# Patient Record
Sex: MALE | Race: WHITE | NOT HISPANIC OR LATINO | Employment: OTHER | URBAN - NONMETROPOLITAN AREA
[De-identification: names, ages, dates, MRNs, and addresses within clinical notes are randomized per-mention and may not be internally consistent; named-entity substitution may affect disease eponyms.]

---

## 2017-01-20 RX ORDER — RANITIDINE 150 MG/1
TABLET ORAL
Qty: 60 TABLET | Refills: 0 | Status: SHIPPED | OUTPATIENT
Start: 2017-01-20 | End: 2017-02-17 | Stop reason: SDUPTHER

## 2017-01-27 RX ORDER — GABAPENTIN 600 MG/1
TABLET ORAL
Qty: 90 TABLET | Refills: 0 | OUTPATIENT
Start: 2017-01-27

## 2017-01-27 RX ORDER — TAMSULOSIN HYDROCHLORIDE 0.4 MG/1
CAPSULE ORAL
Qty: 15 CAPSULE | Refills: 0 | OUTPATIENT
Start: 2017-01-27

## 2017-01-27 RX ORDER — ATORVASTATIN CALCIUM 40 MG/1
TABLET, FILM COATED ORAL
Qty: 30 TABLET | Refills: 0 | OUTPATIENT
Start: 2017-01-27

## 2017-01-27 RX ORDER — ERGOCALCIFEROL 1.25 MG/1
CAPSULE ORAL
Qty: 2 CAPSULE | Refills: 0 | OUTPATIENT
Start: 2017-01-27

## 2017-01-27 RX ORDER — IBUPROFEN 800 MG/1
TABLET ORAL
Qty: 30 TABLET | Refills: 0 | OUTPATIENT
Start: 2017-01-27

## 2017-02-14 RX ORDER — LEVETIRACETAM 1000 MG/1
TABLET ORAL
Qty: 120 TABLET | Refills: 0 | Status: SHIPPED | OUTPATIENT
Start: 2017-02-14 | End: 2017-04-13 | Stop reason: SDUPTHER

## 2017-02-17 RX ORDER — RANITIDINE 150 MG/1
TABLET ORAL
Qty: 60 TABLET | Refills: 0 | Status: SHIPPED | OUTPATIENT
Start: 2017-02-17 | End: 2017-03-16 | Stop reason: SDUPTHER

## 2017-03-17 RX ORDER — RANITIDINE 150 MG/1
TABLET ORAL
Qty: 60 TABLET | Refills: 11 | Status: SHIPPED | OUTPATIENT
Start: 2017-03-17 | End: 2017-07-12 | Stop reason: SDUPTHER

## 2017-04-14 RX ORDER — LEVETIRACETAM 1000 MG/1
TABLET ORAL
Qty: 120 TABLET | Refills: 0 | Status: SHIPPED | OUTPATIENT
Start: 2017-04-14 | End: 2017-06-28 | Stop reason: SDUPTHER

## 2017-06-22 RX ORDER — LEVETIRACETAM 1000 MG/1
TABLET ORAL
Qty: 120 TABLET | Refills: 0 | OUTPATIENT
Start: 2017-06-22

## 2017-06-28 DIAGNOSIS — G40.909 SEIZURE DISORDER (HCC): Primary | ICD-10-CM

## 2017-06-28 RX ORDER — LEVETIRACETAM 1000 MG/1
1000 TABLET ORAL 2 TIMES DAILY
Qty: 60 TABLET | Refills: 0 | Status: SHIPPED | OUTPATIENT
Start: 2017-06-28 | End: 2017-06-30 | Stop reason: SDUPTHER

## 2017-06-29 RX ORDER — LEVETIRACETAM 1000 MG/1
TABLET ORAL
Qty: 120 TABLET | Refills: 0 | OUTPATIENT
Start: 2017-06-29

## 2017-06-30 ENCOUNTER — TELEPHONE (OUTPATIENT)
Dept: FAMILY MEDICINE CLINIC | Facility: CLINIC | Age: 57
End: 2017-06-30

## 2017-06-30 DIAGNOSIS — G40.909 SEIZURE DISORDER (HCC): ICD-10-CM

## 2017-06-30 RX ORDER — LEVETIRACETAM 1000 MG/1
1000 TABLET ORAL 2 TIMES DAILY
Qty: 60 TABLET | Refills: 0 | Status: SHIPPED | OUTPATIENT
Start: 2017-06-30 | End: 2017-07-12 | Stop reason: SDUPTHER

## 2017-06-30 NOTE — TELEPHONE ENCOUNTER
PATIENT NEEDS REFILL ON : KEPPRA   UNTIL APPOINTMENT ON July 12  [facility] IN [place]    THANK YOU  ALEXA

## 2017-07-11 ENCOUNTER — LAB (OUTPATIENT)
Dept: LAB | Facility: HOSPITAL | Age: 57
End: 2017-07-11

## 2017-07-11 ENCOUNTER — OFFICE VISIT (OUTPATIENT)
Dept: FAMILY MEDICINE CLINIC | Facility: CLINIC | Age: 57
End: 2017-07-11

## 2017-07-11 VITALS
OXYGEN SATURATION: 97 % | SYSTOLIC BLOOD PRESSURE: 130 MMHG | HEART RATE: 100 BPM | DIASTOLIC BLOOD PRESSURE: 88 MMHG | WEIGHT: 233 LBS | HEIGHT: 74 IN | BODY MASS INDEX: 29.9 KG/M2

## 2017-07-11 DIAGNOSIS — K21.9 GASTROESOPHAGEAL REFLUX DISEASE, ESOPHAGITIS PRESENCE NOT SPECIFIED: ICD-10-CM

## 2017-07-11 DIAGNOSIS — G40.909 SEIZURE DISORDER (HCC): Primary | ICD-10-CM

## 2017-07-11 DIAGNOSIS — G89.29 CHRONIC BILATERAL LOW BACK PAIN WITHOUT SCIATICA: ICD-10-CM

## 2017-07-11 DIAGNOSIS — M54.50 CHRONIC BILATERAL LOW BACK PAIN WITHOUT SCIATICA: ICD-10-CM

## 2017-07-11 DIAGNOSIS — F17.200 TOBACCO DEPENDENCE: ICD-10-CM

## 2017-07-11 DIAGNOSIS — R76.8 POSITIVE HEPATITIS C ANTIBODY TEST: ICD-10-CM

## 2017-07-11 DIAGNOSIS — I63.9 CEREBROVASCULAR ACCIDENT (CVA), UNSPECIFIED MECHANISM (HCC): ICD-10-CM

## 2017-07-11 DIAGNOSIS — Z13.9 SCREENING: ICD-10-CM

## 2017-07-11 DIAGNOSIS — E06.3 HASHIMOTO'S THYROIDITIS: ICD-10-CM

## 2017-07-11 DIAGNOSIS — Z51.81 MEDICATION MONITORING ENCOUNTER: ICD-10-CM

## 2017-07-11 DIAGNOSIS — E78.5 DYSLIPIDEMIA: ICD-10-CM

## 2017-07-11 LAB
25(OH)D3 SERPL-MCNC: 28.1 NG/ML (ref 30–100)
ALBUMIN SERPL-MCNC: 4.3 G/DL (ref 3.4–4.8)
ALBUMIN/GLOB SERPL: 1.2 G/DL (ref 1.1–1.8)
ALP SERPL-CCNC: 89 U/L (ref 38–126)
ALT SERPL W P-5'-P-CCNC: 35 U/L (ref 21–72)
ANION GAP SERPL CALCULATED.3IONS-SCNC: 14 MMOL/L (ref 5–15)
ARTICHOKE IGE QN: 144 MG/DL (ref 1–129)
AST SERPL-CCNC: 27 U/L (ref 17–59)
BASOPHILS # BLD AUTO: 0.03 10*3/MM3 (ref 0–0.2)
BASOPHILS NFR BLD AUTO: 0.4 % (ref 0–2)
BILIRUB SERPL-MCNC: 0.3 MG/DL (ref 0.2–1.3)
BUN BLD-MCNC: 16 MG/DL (ref 7–21)
BUN/CREAT SERPL: 18.6 (ref 7–25)
CALCIUM SPEC-SCNC: 8.9 MG/DL (ref 8.4–10.2)
CHLORIDE SERPL-SCNC: 102 MMOL/L (ref 95–110)
CHOLEST SERPL-MCNC: 230 MG/DL (ref 0–199)
CO2 SERPL-SCNC: 27 MMOL/L (ref 22–31)
CREAT BLD-MCNC: 0.86 MG/DL (ref 0.7–1.3)
DEPRECATED RDW RBC AUTO: 45.7 FL (ref 35.1–43.9)
EOSINOPHIL # BLD AUTO: 0.26 10*3/MM3 (ref 0–0.7)
EOSINOPHIL NFR BLD AUTO: 3.1 % (ref 0–7)
ERYTHROCYTE [DISTWIDTH] IN BLOOD BY AUTOMATED COUNT: 13.2 % (ref 11.5–14.5)
GFR SERPL CREATININE-BSD FRML MDRD: 92 ML/MIN/1.73 (ref 56–130)
GLOBULIN UR ELPH-MCNC: 3.7 GM/DL (ref 2.3–3.5)
GLUCOSE BLD-MCNC: 86 MG/DL (ref 60–100)
HCT VFR BLD AUTO: 47.4 % (ref 39–49)
HDLC SERPL-MCNC: 29 MG/DL (ref 60–200)
HGB BLD-MCNC: 16.4 G/DL (ref 13.7–17.3)
IMM GRANULOCYTES # BLD: 0.02 10*3/MM3 (ref 0–0.02)
IMM GRANULOCYTES NFR BLD: 0.2 % (ref 0–0.5)
LDLC/HDLC SERPL: ABNORMAL {RATIO} (ref 0–3.55)
LYMPHOCYTES # BLD AUTO: 2.99 10*3/MM3 (ref 0.6–4.2)
LYMPHOCYTES NFR BLD AUTO: 36 % (ref 10–50)
MCH RBC QN AUTO: 33 PG (ref 26.5–34)
MCHC RBC AUTO-ENTMCNC: 34.6 G/DL (ref 31.5–36.3)
MCV RBC AUTO: 95.4 FL (ref 80–98)
MONOCYTES # BLD AUTO: 1.07 10*3/MM3 (ref 0–0.9)
MONOCYTES NFR BLD AUTO: 12.9 % (ref 0–12)
NEUTROPHILS # BLD AUTO: 3.94 10*3/MM3 (ref 2–8.6)
NEUTROPHILS NFR BLD AUTO: 47.4 % (ref 37–80)
PLATELET # BLD AUTO: 287 10*3/MM3 (ref 150–450)
PMV BLD AUTO: 10.5 FL (ref 8–12)
POTASSIUM BLD-SCNC: 3.9 MMOL/L (ref 3.5–5.1)
PROT SERPL-MCNC: 8 G/DL (ref 6.3–8.6)
RBC # BLD AUTO: 4.97 10*6/MM3 (ref 4.37–5.74)
SODIUM BLD-SCNC: 143 MMOL/L (ref 137–145)
TRIGL SERPL-MCNC: 509 MG/DL (ref 20–199)
TSH SERPL DL<=0.05 MIU/L-ACNC: 2.4 MIU/ML (ref 0.46–4.68)
WBC NRBC COR # BLD: 8.31 10*3/MM3 (ref 3.2–9.8)

## 2017-07-11 PROCEDURE — 80050 GENERAL HEALTH PANEL: CPT | Performed by: FAMILY MEDICINE

## 2017-07-11 PROCEDURE — 99213 OFFICE O/P EST LOW 20 MIN: CPT | Performed by: FAMILY MEDICINE

## 2017-07-11 PROCEDURE — 80177 DRUG SCRN QUAN LEVETIRACETAM: CPT | Performed by: FAMILY MEDICINE

## 2017-07-11 PROCEDURE — 80061 LIPID PANEL: CPT | Performed by: FAMILY MEDICINE

## 2017-07-11 PROCEDURE — 86803 HEPATITIS C AB TEST: CPT | Performed by: FAMILY MEDICINE

## 2017-07-11 PROCEDURE — 36415 COLL VENOUS BLD VENIPUNCTURE: CPT | Performed by: FAMILY MEDICINE

## 2017-07-11 PROCEDURE — G0481 DRUG TEST DEF 8-14 CLASSES: HCPCS | Performed by: FAMILY MEDICINE

## 2017-07-11 PROCEDURE — 80307 DRUG TEST PRSMV CHEM ANLYZR: CPT | Performed by: FAMILY MEDICINE

## 2017-07-11 PROCEDURE — 84439 ASSAY OF FREE THYROXINE: CPT | Performed by: FAMILY MEDICINE

## 2017-07-11 PROCEDURE — 82306 VITAMIN D 25 HYDROXY: CPT | Performed by: FAMILY MEDICINE

## 2017-07-11 PROCEDURE — 87522 HEPATITIS C REVRS TRNSCRPJ: CPT | Performed by: FAMILY MEDICINE

## 2017-07-11 RX ORDER — GABAPENTIN 600 MG/1
600 TABLET ORAL 3 TIMES DAILY
Qty: 90 TABLET | Refills: 0 | Status: SHIPPED | OUTPATIENT
Start: 2017-07-11 | End: 2018-02-21 | Stop reason: SINTOL

## 2017-07-12 PROBLEM — F17.200 TOBACCO DEPENDENCE: Status: ACTIVE | Noted: 2017-07-12

## 2017-07-12 LAB — HCV AB SER DONR QL: REACTIVE

## 2017-07-12 RX ORDER — LEVOTHYROXINE SODIUM 0.03 MG/1
25 TABLET ORAL DAILY
Qty: 30 TABLET | Refills: 3 | Status: SHIPPED | OUTPATIENT
Start: 2017-07-12 | End: 2018-01-08 | Stop reason: SDUPTHER

## 2017-07-12 RX ORDER — LEVETIRACETAM 1000 MG/1
1000 TABLET ORAL 2 TIMES DAILY
Qty: 60 TABLET | Refills: 2 | Status: SHIPPED | OUTPATIENT
Start: 2017-07-12 | End: 2017-08-08 | Stop reason: SDUPTHER

## 2017-07-12 RX ORDER — ASPIRIN 81 MG/1
81 TABLET, CHEWABLE ORAL DAILY
Qty: 30 TABLET | Refills: 11 | Status: SHIPPED | OUTPATIENT
Start: 2017-07-12 | End: 2018-02-21 | Stop reason: SDUPTHER

## 2017-07-12 RX ORDER — LEVOTHYROXINE SODIUM 0.03 MG/1
25 TABLET ORAL DAILY
COMMUNITY
Start: 2017-07-07 | End: 2017-07-12 | Stop reason: SDUPTHER

## 2017-07-12 RX ORDER — RANITIDINE 150 MG/1
150 TABLET ORAL 2 TIMES DAILY
Qty: 60 TABLET | Refills: 11 | Status: SHIPPED | OUTPATIENT
Start: 2017-07-12 | End: 2018-02-21 | Stop reason: SDUPTHER

## 2017-07-12 RX ORDER — ATORVASTATIN CALCIUM 40 MG/1
40 TABLET, FILM COATED ORAL DAILY
Qty: 30 TABLET | Refills: 3 | Status: SHIPPED | OUTPATIENT
Start: 2017-07-12 | End: 2018-03-22 | Stop reason: SDUPTHER

## 2017-07-12 RX ORDER — BACLOFEN 10 MG/1
10 TABLET ORAL 3 TIMES DAILY
COMMUNITY
Start: 2016-02-10 | End: 2017-07-12 | Stop reason: SDUPTHER

## 2017-07-12 RX ORDER — ASPIRIN 81 MG/1
81 TABLET, CHEWABLE ORAL DAILY
COMMUNITY
Start: 2017-04-13 | End: 2017-07-12 | Stop reason: SDUPTHER

## 2017-07-12 RX ORDER — BACLOFEN 10 MG/1
10 TABLET ORAL 3 TIMES DAILY
Qty: 90 TABLET | Refills: 3 | Status: SHIPPED | OUTPATIENT
Start: 2017-07-12 | End: 2018-02-21 | Stop reason: SDUPTHER

## 2017-07-12 NOTE — PROGRESS NOTES
"  Subjective:     Chief Complaint:   Chief Complaint   Patient presents with   • Med Refill   • Seizures     not in a long time    • Back Pain       Martin Bender is a 56 y.o. male who presents to establish care.  He is a former patient of Dr. Lynn, though curiously chart review shows that he hasn't seen by him in over a year and half.  He has a history of seizure disorder which is well controlled on Keppra.  He states his last seizure was over 10 years ago.  Patient states he has had 2 strokes, most recent was 1 year ago (June 2016) and he was admitted to [facility].  He last saw a neurologist in [place] one year ago.  According to the patient he was \"cleared\" by them.  He also has extensive history of kidney stones, patient states she's had 25 kidney stone removals.  He is following with urology in [place], Dr. Kraus.  Additionally he has been followed by Dr. Nomi Stevenson for Hashimoto's thyroiditis.  He was last seen by him one year ago.    The patient really has no concerns today.  He just wants to establish care with me, and get all of his medications.  He has been only taking the Keppra.        Past Medical Hx:  Past Medical History:   Diagnosis Date   • Age related cataract     right   • Artificial lens present     in position   • Dyslipidemia    • Encounter for general adult medical examination without abnormal findings    • Gastroesophageal reflux disease    • Hashimoto's thyroiditis    • Headache    • Kidney stone    • Primary hyperparathyroidism     possible primary normocalcemic but vit D has to be corrected first   • Secondary hyperparathyroidism    • Seizure disorder        Past Surgical Hx:  Past Surgical History:   Procedure Laterality Date   • COLONOSCOPY  2015   • CYSTOSCOPY  01/11/2016    Left ureteroscopy with laser lithotripsy.Left retrograde pyelography.Left double J stent insertion, double J stent with no tether. [facility]. Per patient had stone removal " "\"25 times\"       Health Maintenance:  Health Maintenance   Topic Date Due   • INFLUENZA VACCINE  08/01/2017   • COLONOSCOPY  01/01/2025   • TDAP/TD VACCINES (2 - Td) 01/28/2026   • PNEUMOCOCCAL VACCINE (19-64 MEDIUM RISK)  Completed   • HEPATITIS C SCREENING  Completed       Current Meds:    Current Outpatient Prescriptions:   •  atorvastatin (LIPITOR) 40 MG tablet, Take 1 tablet by mouth Daily., Disp: 30 tablet, Rfl: 3  •  baclofen (LIORESAL) 10 MG tablet, Take 1 tablet by mouth 3 (Three) Times a Day., Disp: 90 tablet, Rfl: 3  •  gabapentin (NEURONTIN) 600 MG tablet, Take 1 tablet by mouth 3 (Three) Times a Day., Disp: 90 tablet, Rfl: 0  •  levETIRAcetam (KEPPRA) 1000 MG tablet, Take 1 tablet by mouth 2 (Two) Times a Day., Disp: 60 tablet, Rfl: 2  •  raNITIdine (ZANTAC) 150 MG tablet, Take 1 tablet by mouth 2 (Two) Times a Day., Disp: 60 tablet, Rfl: 11  •  aspirin 81 MG chewable tablet, Chew 1 tablet Daily., Disp: 30 tablet, Rfl: 11  •  levothyroxine (SYNTHROID, LEVOTHROID) 25 MCG tablet, Take 1 tablet by mouth Daily., Disp: 30 tablet, Rfl: 3    Allergies:  Penicillins    Family Hx:  Family History   Problem Relation Age of Onset   • Cancer Other    • Diabetes Other    • Heart disease Other    • Hypertension Other         Social History:  Social History     Social History   • Marital status: Single     Spouse name: N/A   • Number of children: N/A   • Years of education: N/A     Occupational History   • Not on file.     Social History Main Topics   • Smoking status: Current Every Day Smoker     Packs/day: 0.50     Years: 30.00     Types: Cigarettes   • Smokeless tobacco: Not on file   • Alcohol use No   • Drug use: No   • Sexual activity: Not on file     Other Topics Concern   • Not on file     Social History Narrative       Review of Systems  Review of Systems   Constitutional: Negative for activity change and appetite change.   HENT: Negative for congestion and dental problem.    Eyes: Negative for discharge and " "itching.   Respiratory: Negative for apnea.    Cardiovascular: Negative for chest pain and leg swelling.   Gastrointestinal: Negative for abdominal distention and abdominal pain.   Endocrine: Negative for cold intolerance.   Genitourinary: Negative for difficulty urinating and enuresis.   Musculoskeletal: Negative for arthralgias and back pain.   Allergic/Immunologic: Negative for environmental allergies and food allergies.   Neurological: Negative for dizziness, facial asymmetry and speech difficulty.   Hematological: Negative for adenopathy.   Psychiatric/Behavioral: Negative for agitation and behavioral problems.         Objective:     /88  Pulse 100  Ht 74\" (188 cm)  Wt 233 lb (106 kg)  SpO2 97%  BMI 29.92 kg/m2    General:  alert, appears stated age and cooperative   Oropharynx: lips, mucosa, and tongue normal; teeth and gums normal    Eyes:  conjunctivae/corneas clear. PERRL, EOM's intact. Fundi benign.    Ears:  normal TM's and external ear canals both ears   Neck: no adenopathy, no carotid bruit, no JVD, supple, symmetrical, trachea midline and thyroid not enlarged, symmetric, no tenderness/mass/nodules   Thyroid:  no palpable nodule   Lung: clear to auscultation bilaterally   Heart:  regular rate and rhythm, S1, S2 normal, no murmur, click, rub or gallop   Abdomen: soft, non-tender; bowel sounds normal; no masses,  no organomegaly   Extremities: extremities normal, atraumatic, no cyanosis or edema   Skin: warm and dry, no hyperpigmentation, vitiligo, or suspicious lesions   Pulses: 2+ and symmetric   Neuro: normal without focal findings, mental status, speech normal, alert and oriented x3 and reflexes normal and symmetric        Assessment/Plan:     1. Medication monitoring encounter  - ToxASSURE Select 13 (); Future  - Levetiracetam Level (Keppra); Future    2. Hashimoto's thyroiditis  - TSH  - levothyroxine (SYNTHROID, LEVOTHROID) 25 MCG tablet; Take 1 tablet by mouth Daily.  Dispense: 30 " tablet; Refill: 3  - Recommended patient see Dr. Nomi Stevenson for follow-up (last seen June 2016).    3. Screening  - Vitamin D 25 hydroxy; Future  - Hepatitis C antibody; Future  - CBC w AUTO Differential; Future  - Comprehensive Metabolic Panel  - Lipid Panel    4. Chronic bilateral low back pain without sciatica  - Patient would like to stay on Neurontin.  Explained to him that with the recent state law changes, he would need to provide a urine specimen for ToxAssure today, and sign drug contract.  Patient is agreeable to do both.  - gabapentin (NEURONTIN) 600 MG tablet; Take 1 tablet by mouth 3 (Three) Times a Day.  Dispense: 90 tablet; Refill: 0  - baclofen (LIORESAL) 10 MG tablet; Take 1 tablet by mouth 3 (Three) Times a Day.  Dispense: 90 tablet; Refill: 3    5. Cerebrovascular accident (CVA), unspecified mechanism  - aspirin 81 MG chewable tablet; Chew 1 tablet Daily.  Dispense: 30 tablet; Refill: 11  - Recommended patient see his neurologist in [place].    6. Gastroesophageal reflux disease, esophagitis presence not specified  - raNITIdine (ZANTAC) 150 MG tablet; Take 1 tablet by mouth 2 (Two) Times a Day.  Dispense: 60 tablet; Refill: 11    7. Seizure disorder  - levETIRAcetam (KEPPRA) 1000 MG tablet; Take 1 tablet by mouth 2 (Two) Times a Day.  Dispense: 60 tablet; Refill: 2    8. Dyslipidemia  - atorvastatin (LIPITOR) 40 MG tablet; Take 1 tablet by mouth Daily.  Dispense: 30 tablet; Refill: 3    Return in about 3 months (around 10/11/2017) for Recheck.    GOALS:  Lose weight, remain seizure-free  BARRIERS TO GOALS:  Poor follow-up    Preventative:  Male Preventative: Last colonoscopy was done 2015.  No record of Hep C antibody test in system.  Patient states he does have history of Hepatitis C.  He will check antibody.  Smoking cessation counseling was provided. Tobacco dependence:  Counseled, 3-10 minutes spent, asymptomatic. Still pre-contemplative.  does not drink  eat more fruits and  vegetables, decrease soda or juice intake, increase water intake, increase physical activity and have 3 meals a day    RISK SCORE: 4          This document has been electronically signed by Branden Lin MD on July 12, 2017 2:01 PM      EMR Dragon/Transcription disclaimer:   Some of this note may be an electronic transcription/translation of spoken language to printed text. The electronic translation of spoken language may permit erroneous, or at times, nonsensical words or phrases to be inadvertently transcribed; Although I have reviewed the note for such errors, some may still exist.

## 2017-07-13 NOTE — PROGRESS NOTES
I have reviewed the notes, assessments, and/or procedures performed. I concur with her/his documentation of Martin Bender.     Timbo Chamorro, DO

## 2017-07-14 LAB — LEVETIRACETAM SERPL-MCNC: 22.4 UG/ML (ref 10–40)

## 2017-07-15 DIAGNOSIS — E06.3 HASHIMOTO'S THYROIDITIS: Primary | ICD-10-CM

## 2017-07-15 DIAGNOSIS — R76.8 POSITIVE HEPATITIS C ANTIBODY TEST: ICD-10-CM

## 2017-07-15 LAB — T4 FREE SERPL-MCNC: 1.09 NG/DL (ref 0.78–2.19)

## 2017-07-18 LAB
HCV RNA SERPL NAA+PROBE-ACNC: NORMAL IU/ML
TEST INFORMATION: NORMAL

## 2017-07-20 LAB — CONV REPORT SUMMARY: NORMAL

## 2017-08-07 RX ORDER — LEVETIRACETAM 1000 MG/1
TABLET ORAL
Qty: 120 TABLET | Refills: 0 | OUTPATIENT
Start: 2017-08-07

## 2017-08-08 DIAGNOSIS — G40.909 SEIZURE DISORDER (HCC): ICD-10-CM

## 2017-08-08 RX ORDER — LEVETIRACETAM 1000 MG/1
1000 TABLET ORAL 2 TIMES DAILY
Qty: 60 TABLET | Refills: 2 | Status: SHIPPED | OUTPATIENT
Start: 2017-08-08 | End: 2018-02-09 | Stop reason: SDUPTHER

## 2017-10-17 DIAGNOSIS — G89.29 CHRONIC BILATERAL LOW BACK PAIN WITHOUT SCIATICA: ICD-10-CM

## 2017-10-17 DIAGNOSIS — M54.50 CHRONIC BILATERAL LOW BACK PAIN WITHOUT SCIATICA: ICD-10-CM

## 2017-10-17 RX ORDER — GABAPENTIN 600 MG/1
TABLET ORAL
Qty: 90 TABLET | Refills: 2 | OUTPATIENT
Start: 2017-10-17

## 2017-10-23 DIAGNOSIS — M54.50 CHRONIC BILATERAL LOW BACK PAIN WITHOUT SCIATICA: ICD-10-CM

## 2017-10-23 DIAGNOSIS — G89.29 CHRONIC BILATERAL LOW BACK PAIN WITHOUT SCIATICA: ICD-10-CM

## 2017-10-24 RX ORDER — GABAPENTIN 600 MG/1
TABLET ORAL
Qty: 90 TABLET | Refills: 2 | OUTPATIENT
Start: 2017-10-24

## 2018-01-08 ENCOUNTER — TELEPHONE (OUTPATIENT)
Dept: FAMILY MEDICINE CLINIC | Facility: CLINIC | Age: 58
End: 2018-01-08

## 2018-01-08 DIAGNOSIS — E06.3 HASHIMOTO'S THYROIDITIS: ICD-10-CM

## 2018-01-08 RX ORDER — LEVOTHYROXINE SODIUM 0.03 MG/1
TABLET ORAL
Qty: 30 TABLET | Refills: 0 | Status: SHIPPED | OUTPATIENT
Start: 2018-01-08 | End: 2018-02-21 | Stop reason: SDUPTHER

## 2018-01-08 NOTE — TELEPHONE ENCOUNTER
Refilled Synthroid 30 days.          This document has been electronically signed by Branden Lin MD on January 8, 2018 2:30 PM

## 2018-01-08 NOTE — TELEPHONE ENCOUNTER
CALLED PT TO LET HIM KNOW THAT 30 DAYS OF MEDS WAS CALLED IN BUT HE WOULD NEED TO BE SEEN PRIOR TO NEXT REFILL    PT DID NOT ANSWER LEFT A MESSAGE

## 2018-02-05 DIAGNOSIS — E06.3 HASHIMOTO'S THYROIDITIS: ICD-10-CM

## 2018-02-05 DIAGNOSIS — G40.909 SEIZURE DISORDER (HCC): ICD-10-CM

## 2018-02-05 RX ORDER — LEVETIRACETAM 1000 MG/1
TABLET ORAL
Qty: 60 TABLET | Refills: 2 | OUTPATIENT
Start: 2018-02-05

## 2018-02-05 RX ORDER — LEVOTHYROXINE SODIUM 0.03 MG/1
TABLET ORAL
Qty: 30 TABLET | Refills: 0 | OUTPATIENT
Start: 2018-02-05

## 2018-02-09 DIAGNOSIS — G40.909 SEIZURE DISORDER (HCC): ICD-10-CM

## 2018-02-09 RX ORDER — LEVETIRACETAM 1000 MG/1
TABLET ORAL
Qty: 60 TABLET | Refills: 0 | Status: SHIPPED | OUTPATIENT
Start: 2018-02-09 | End: 2018-02-21 | Stop reason: SDUPTHER

## 2018-02-21 ENCOUNTER — OFFICE VISIT (OUTPATIENT)
Dept: FAMILY MEDICINE CLINIC | Facility: CLINIC | Age: 58
End: 2018-02-21

## 2018-02-21 ENCOUNTER — APPOINTMENT (OUTPATIENT)
Dept: LAB | Facility: HOSPITAL | Age: 58
End: 2018-02-21

## 2018-02-21 VITALS
WEIGHT: 242 LBS | HEART RATE: 122 BPM | DIASTOLIC BLOOD PRESSURE: 80 MMHG | BODY MASS INDEX: 31.06 KG/M2 | HEIGHT: 74 IN | OXYGEN SATURATION: 95 % | SYSTOLIC BLOOD PRESSURE: 130 MMHG

## 2018-02-21 DIAGNOSIS — M54.5 CHRONIC LOW BACK PAIN, UNSPECIFIED BACK PAIN LATERALITY, WITH SCIATICA PRESENCE UNSPECIFIED: Primary | ICD-10-CM

## 2018-02-21 DIAGNOSIS — G89.29 CHRONIC LOW BACK PAIN, UNSPECIFIED BACK PAIN LATERALITY, WITH SCIATICA PRESENCE UNSPECIFIED: Primary | ICD-10-CM

## 2018-02-21 DIAGNOSIS — E06.3 HASHIMOTO'S THYROIDITIS: ICD-10-CM

## 2018-02-21 DIAGNOSIS — M54.50 CHRONIC BILATERAL LOW BACK PAIN WITHOUT SCIATICA: ICD-10-CM

## 2018-02-21 DIAGNOSIS — G40.909 SEIZURE DISORDER (HCC): ICD-10-CM

## 2018-02-21 DIAGNOSIS — Z87.442 HISTORY OF NEPHROLITHIASIS: ICD-10-CM

## 2018-02-21 DIAGNOSIS — I63.9 CEREBROVASCULAR ACCIDENT (CVA), UNSPECIFIED MECHANISM (HCC): ICD-10-CM

## 2018-02-21 DIAGNOSIS — K21.9 GASTROESOPHAGEAL REFLUX DISEASE, ESOPHAGITIS PRESENCE NOT SPECIFIED: ICD-10-CM

## 2018-02-21 DIAGNOSIS — G89.29 CHRONIC BILATERAL LOW BACK PAIN WITHOUT SCIATICA: ICD-10-CM

## 2018-02-21 LAB
ALBUMIN SERPL-MCNC: 4.3 G/DL (ref 3.4–4.8)
ALBUMIN/GLOB SERPL: 1.2 G/DL (ref 1.1–1.8)
ALP SERPL-CCNC: 100 U/L (ref 38–126)
ALT SERPL W P-5'-P-CCNC: 49 U/L (ref 21–72)
ANION GAP SERPL CALCULATED.3IONS-SCNC: 14 MMOL/L (ref 5–15)
AST SERPL-CCNC: 33 U/L (ref 17–59)
BILIRUB SERPL-MCNC: 0.8 MG/DL (ref 0.2–1.3)
BUN BLD-MCNC: 12 MG/DL (ref 7–21)
BUN/CREAT SERPL: 14 (ref 7–25)
CALCIUM SPEC-SCNC: 9.5 MG/DL (ref 8.4–10.2)
CHLORIDE SERPL-SCNC: 104 MMOL/L (ref 95–110)
CO2 SERPL-SCNC: 25 MMOL/L (ref 22–31)
CREAT BLD-MCNC: 0.86 MG/DL (ref 0.7–1.3)
GFR SERPL CREATININE-BSD FRML MDRD: 92 ML/MIN/1.73 (ref 56–130)
GLOBULIN UR ELPH-MCNC: 3.6 GM/DL (ref 2.3–3.5)
GLUCOSE BLD-MCNC: 88 MG/DL (ref 60–100)
POTASSIUM BLD-SCNC: 4 MMOL/L (ref 3.5–5.1)
PROT SERPL-MCNC: 7.9 G/DL (ref 6.3–8.6)
SODIUM BLD-SCNC: 143 MMOL/L (ref 137–145)
TSH SERPL DL<=0.05 MIU/L-ACNC: 1.91 MIU/ML (ref 0.46–4.68)

## 2018-02-21 PROCEDURE — 36415 COLL VENOUS BLD VENIPUNCTURE: CPT | Performed by: FAMILY MEDICINE

## 2018-02-21 PROCEDURE — 80053 COMPREHEN METABOLIC PANEL: CPT | Performed by: FAMILY MEDICINE

## 2018-02-21 PROCEDURE — 84443 ASSAY THYROID STIM HORMONE: CPT | Performed by: FAMILY MEDICINE

## 2018-02-21 PROCEDURE — 99213 OFFICE O/P EST LOW 20 MIN: CPT | Performed by: FAMILY MEDICINE

## 2018-02-21 RX ORDER — LEVETIRACETAM 1000 MG/1
1000 TABLET ORAL EVERY 12 HOURS SCHEDULED
Qty: 60 TABLET | Refills: 0 | Status: SHIPPED | OUTPATIENT
Start: 2018-02-21 | End: 2018-03-22 | Stop reason: SDUPTHER

## 2018-02-21 RX ORDER — RANITIDINE 150 MG/1
150 TABLET ORAL NIGHTLY
Qty: 60 TABLET | Refills: 11 | Status: SHIPPED | OUTPATIENT
Start: 2018-02-21 | End: 2018-03-22 | Stop reason: SDUPTHER

## 2018-02-21 RX ORDER — BACLOFEN 10 MG/1
10 TABLET ORAL 3 TIMES DAILY
Qty: 90 TABLET | Refills: 3 | Status: SHIPPED | OUTPATIENT
Start: 2018-02-21 | End: 2018-03-22 | Stop reason: SDUPTHER

## 2018-02-21 RX ORDER — ASPIRIN 81 MG/1
81 TABLET, CHEWABLE ORAL DAILY
Qty: 30 TABLET | Refills: 11 | Status: SHIPPED | OUTPATIENT
Start: 2018-02-21 | End: 2018-03-22 | Stop reason: SDUPTHER

## 2018-02-21 RX ORDER — LEVOTHYROXINE SODIUM 0.03 MG/1
25 TABLET ORAL DAILY
Qty: 30 TABLET | Refills: 0 | Status: SHIPPED | OUTPATIENT
Start: 2018-02-21 | End: 2018-03-22 | Stop reason: SDUPTHER

## 2018-02-22 NOTE — PROGRESS NOTES
"  Subjective:     Chief Complaint:   Chief Complaint   Patient presents with   • Med Refill   • Follow-up   • Back Pain       Martin Bender is a 57 y.o. male who presents for follow-up.  I saw him last in July 2017, he states that he had family members with his severe medical illnesses and that's like he wasn't able to follow-up until now.  His main concern today is bilateral flank pain and \"burning\", present for 1 month.  Patient states he's had recurrent kidney stones which required lithotripsy \"25 times.\"  He is currently following with Urology, Dr. Kraus in [place].  He denies any fever, chills, symptoms of dysuria or hematuria.    He has a history of seizure disorder which is well controlled on Keppra.  He states his last seizure was over 10 years ago.  Patient states he has had 2 strokes, most recent was 1 year ago (June 2016) and he was admitted to [facility].  He last saw a neurologist in [place] one year ago.  According to the patient he was \"cleared\" by them.  Additionally he has been followed by Dr. Nomi Stevenson for Hashimoto's thyroiditis.  He was last seen by him one year ago.    Past Medical Hx:  Past Medical History:   Diagnosis Date   • Age related cataract     right   • Artificial lens present     in position   • Dyslipidemia    • Encounter for general adult medical examination without abnormal findings    • Gastroesophageal reflux disease    • Hashimoto's thyroiditis    • Headache    • Kidney stone    • Primary hyperparathyroidism     possible primary normocalcemic but vit D has to be corrected first   • Secondary hyperparathyroidism    • Seizure disorder        Past Surgical Hx:  Past Surgical History:   Procedure Laterality Date   • COLONOSCOPY  2015   • CYSTOSCOPY  01/11/2016    Left ureteroscopy with laser lithotripsy.Left retrograde pyelography.Left double J stent insertion, double J stent with no tether. [facility]. Per patient had stone removal \"25 " "times\"       Health Maintenance:  Health Maintenance   Topic Date Due   • COLONOSCOPY  01/01/2025   • TDAP/TD VACCINES (2 - Td) 01/28/2026   • PNEUMOCOCCAL VACCINE (19-64 MEDIUM RISK)  Completed   • HEPATITIS C SCREENING  Completed   • INFLUENZA VACCINE  Addressed       Current Meds:    Current Outpatient Prescriptions:   •  aspirin 81 MG chewable tablet, Chew 1 tablet Daily., Disp: 30 tablet, Rfl: 11  •  atorvastatin (LIPITOR) 40 MG tablet, Take 1 tablet by mouth Daily., Disp: 30 tablet, Rfl: 3  •  baclofen (LIORESAL) 10 MG tablet, Take 1 tablet by mouth 3 (Three) Times a Day., Disp: 90 tablet, Rfl: 3  •  levETIRAcetam (KEPPRA) 1000 MG tablet, Take 1 tablet by mouth Every 12 (Twelve) Hours., Disp: 60 tablet, Rfl: 0  •  levothyroxine (SYNTHROID, LEVOTHROID) 25 MCG tablet, Take 1 tablet by mouth Daily., Disp: 30 tablet, Rfl: 0  •  raNITIdine (ZANTAC) 150 MG tablet, Take 1 tablet by mouth Every Night., Disp: 60 tablet, Rfl: 11    Allergies:  Penicillins    Family Hx:  Family History   Problem Relation Age of Onset   • Cancer Other    • Diabetes Other    • Heart disease Other    • Hypertension Other         Social History:  Social History     Social History   • Marital status: Single     Spouse name: N/A   • Number of children: N/A   • Years of education: N/A     Occupational History   • Not on file.     Social History Main Topics   • Smoking status: Current Every Day Smoker     Packs/day: 0.50     Years: 30.00     Types: Cigarettes   • Smokeless tobacco: Not on file   • Alcohol use No   • Drug use: No   • Sexual activity: Not on file     Other Topics Concern   • Not on file     Social History Narrative       Review of Systems  Review of Systems   Constitutional: Negative for activity change and appetite change.   HENT: Negative for congestion and dental problem.    Eyes: Negative for discharge and itching.   Respiratory: Negative for apnea.    Cardiovascular: Negative for chest pain and leg swelling. " "  Gastrointestinal: Negative for abdominal distention and abdominal pain.   Endocrine: Negative for cold intolerance.   Genitourinary: Positive for flank pain. Negative for difficulty urinating, dysuria and enuresis.   Musculoskeletal: Negative for arthralgias and back pain.   Allergic/Immunologic: Negative for environmental allergies and food allergies.   Neurological: Negative for dizziness, facial asymmetry and speech difficulty.   Hematological: Negative for adenopathy.   Psychiatric/Behavioral: Negative for agitation and behavioral problems.         Objective:     /80  Pulse (!) 122  Ht 188 cm (74\")  Wt 110 kg (242 lb)  SpO2 95%  BMI 31.07 kg/m2    General:  alert, appears stated age and cooperative. Sitting comfortably in chair   Oropharynx: lips, mucosa, and tongue normal; teeth and gums normal    Eyes:  conjunctivae/corneas clear. PERRL, EOM's intact. Fundi benign.    Ears:  normal TM's and external ear canals both ears   Neck: no adenopathy, no carotid bruit, no JVD, supple, symmetrical, trachea midline and thyroid not enlarged, symmetric, no tenderness/mass/nodules   Thyroid:  no palpable nodule   Lung: clear to auscultation bilaterally   Heart:  regular rate and rhythm, S1, S2 normal, no murmur, click, rub or gallop   Abdomen: soft, non-tender; bowel sounds normal; no masses,  no organomegaly   Extremities: extremities normal, atraumatic, no cyanosis or edema   Skin: warm and dry, no hyperpigmentation, vitiligo, or suspicious lesions   Pulses: 2+ and symmetric   Neuro: normal without focal findings, mental status, speech normal, alert and oriented x3 and reflexes normal and symmetric        Assessment/Plan:   Martin was seen today for med refill, follow-up and back pain.    Diagnoses and all orders for this visit:    Chronic low back pain, unspecified back pain laterality, with sciatica presence unspecified  -     XR spine lumbar 4+ vw; Future  -     XR sacrum and coccyx; Future    Hashimoto's " thyroiditis  -     Comprehensive Metabolic Panel  -     TSH  -     levothyroxine (SYNTHROID, LEVOTHROID) 25 MCG tablet; Take 1 tablet by mouth Daily.    Cerebrovascular accident (CVA), unspecified mechanism  -     aspirin 81 MG chewable tablet; Chew 1 tablet Daily.    Chronic bilateral low back pain without sciatica  -     baclofen (LIORESAL) 10 MG tablet; Take 1 tablet by mouth 3 (Three) Times a Day.    Seizure disorder  -     levETIRAcetam (KEPPRA) 1000 MG tablet; Take 1 tablet by mouth Every 12 (Twelve) Hours.    Gastroesophageal reflux disease, esophagitis presence not specified  -     raNITIdine (ZANTAC) 150 MG tablet; Take 1 tablet by mouth Every Night.    History of nephrolithiasis        - Discussed case with Dr. Ayala.  Given patient has been symptomatic for one month, and is sitting comfortably in the chair, is unlikely that he has nephrolithiasis at this time.  Will order lumbosacral x-rays for evaluation as noted above.  Patient needs to follow-up with his urologist in [place].    Other orders  -     Cancel: XR Abdomen KUB    Return in about 1 month (around 3/21/2018) for Recheck.    Goals     • Less pain            Barriers:  none            Preventative:  Male Preventative: Last colonoscopy was done 2015.  Smoking cessation counseling was provided. Tobacco dependence:  Counseled, 3-10 minutes spent, asymptomatic. Still pre-contemplative.  does not drink  eat more fruits and vegetables, decrease soda or juice intake, increase water intake, increase physical activity and have 3 meals a day    RISK SCORE: 4          This document has been electronically signed by Branden Lin MD on February 21, 2018 9:50 PM

## 2018-03-22 ENCOUNTER — OFFICE VISIT (OUTPATIENT)
Dept: FAMILY MEDICINE CLINIC | Facility: CLINIC | Age: 58
End: 2018-03-22

## 2018-03-22 VITALS
WEIGHT: 245 LBS | OXYGEN SATURATION: 95 % | BODY MASS INDEX: 31.44 KG/M2 | DIASTOLIC BLOOD PRESSURE: 78 MMHG | HEART RATE: 115 BPM | HEIGHT: 74 IN | SYSTOLIC BLOOD PRESSURE: 130 MMHG

## 2018-03-22 DIAGNOSIS — I63.9 CEREBROVASCULAR ACCIDENT (CVA), UNSPECIFIED MECHANISM (HCC): ICD-10-CM

## 2018-03-22 DIAGNOSIS — K21.9 GASTROESOPHAGEAL REFLUX DISEASE, ESOPHAGITIS PRESENCE NOT SPECIFIED: ICD-10-CM

## 2018-03-22 DIAGNOSIS — G89.29 CHRONIC BILATERAL LOW BACK PAIN WITHOUT SCIATICA: ICD-10-CM

## 2018-03-22 DIAGNOSIS — E78.5 DYSLIPIDEMIA: ICD-10-CM

## 2018-03-22 DIAGNOSIS — M87.256: Primary | ICD-10-CM

## 2018-03-22 DIAGNOSIS — M54.50 CHRONIC BILATERAL LOW BACK PAIN WITHOUT SCIATICA: ICD-10-CM

## 2018-03-22 DIAGNOSIS — G40.909 SEIZURE DISORDER (HCC): ICD-10-CM

## 2018-03-22 DIAGNOSIS — F17.200 TOBACCO DEPENDENCE: ICD-10-CM

## 2018-03-22 DIAGNOSIS — E06.3 HASHIMOTO'S THYROIDITIS: ICD-10-CM

## 2018-03-22 PROCEDURE — 99213 OFFICE O/P EST LOW 20 MIN: CPT | Performed by: FAMILY MEDICINE

## 2018-03-22 RX ORDER — LEVOTHYROXINE SODIUM 0.03 MG/1
25 TABLET ORAL DAILY
Qty: 30 TABLET | Refills: 3 | Status: SHIPPED | OUTPATIENT
Start: 2018-03-22 | End: 2018-08-17 | Stop reason: SDUPTHER

## 2018-03-22 RX ORDER — RANITIDINE 150 MG/1
150 TABLET ORAL NIGHTLY
Qty: 60 TABLET | Refills: 11 | Status: SHIPPED | OUTPATIENT
Start: 2018-03-22 | End: 2018-08-24 | Stop reason: SDUPTHER

## 2018-03-22 RX ORDER — ASPIRIN 81 MG/1
81 TABLET, CHEWABLE ORAL DAILY
Qty: 30 TABLET | Refills: 11 | Status: SHIPPED | OUTPATIENT
Start: 2018-03-22 | End: 2018-08-24 | Stop reason: SDUPTHER

## 2018-03-22 RX ORDER — LEVETIRACETAM 1000 MG/1
1000 TABLET ORAL EVERY 12 HOURS SCHEDULED
Qty: 60 TABLET | Refills: 3 | Status: SHIPPED | OUTPATIENT
Start: 2018-03-22 | End: 2018-08-17 | Stop reason: SDUPTHER

## 2018-03-22 RX ORDER — ATORVASTATIN CALCIUM 40 MG/1
40 TABLET, FILM COATED ORAL DAILY
Qty: 30 TABLET | Refills: 3 | Status: SHIPPED | OUTPATIENT
Start: 2018-03-22 | End: 2018-08-17 | Stop reason: SDUPTHER

## 2018-03-22 RX ORDER — BACLOFEN 10 MG/1
10 TABLET ORAL 3 TIMES DAILY
Qty: 90 TABLET | Refills: 3 | Status: SHIPPED | OUTPATIENT
Start: 2018-03-22 | End: 2018-08-17 | Stop reason: SDUPTHER

## 2018-03-22 NOTE — PROGRESS NOTES
"  Subjective:     Chief Complaint:   Chief Complaint   Patient presents with   • Flank Pain     Martin Bender is a 57 y.o. male who presents for follow-up of \"burning\" flank pain, medication refills.  It continues to be intermittent, he has had \"25 lithotripsies\", and thinks \"when they put in that stent, it messed me up.\"  He has not followed up with his Urologist in [place] since I last saw him.  He does also note occasional right hip pain when he bears weight, but it is less severe then the flank pain.  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    He has a history of seizure disorder which is well controlled on Keppra.  He states his last seizure was over 10 years ago.  Patient states he has had 2 strokes, most recent was 1 year ago (June 2016) and he was admitted to [facility].  He last saw a neurologist in [place] one year ago.  According to the patient he was \"cleared\" by them.  Additionally he has been followed by Dr. Nomi Stevenson for Hashimoto's thyroiditis.  He was last seen by him one year ago.    Past Medical Hx:  Past Medical History:   Diagnosis Date   • Age related cataract     right   • Artificial lens present     in position   • Dyslipidemia    • Encounter for general adult medical examination without abnormal findings    • Gastroesophageal reflux disease    • Hashimoto's thyroiditis    • Headache    • Kidney stone    • Primary hyperparathyroidism     possible primary normocalcemic but vit D has to be corrected first   • Secondary hyperparathyroidism    • Seizure disorder        Past Surgical Hx:  Past Surgical History:   Procedure Laterality Date   • COLONOSCOPY  2015   • CYSTOSCOPY  01/11/2016    Left ureteroscopy with laser lithotripsy.Left retrograde pyelography.Left double J stent insertion, double J stent with no tether. [facility]. Per patient had stone removal \"25 times\"       Health " Maintenance:  Health Maintenance   Topic Date Due   • COLONOSCOPY  01/01/2025   • TDAP/TD VACCINES (2 - Td) 01/28/2026   • PNEUMOCOCCAL VACCINE (19-64 MEDIUM RISK)  Completed   • HEPATITIS C SCREENING  Completed   • INFLUENZA VACCINE  Addressed       Current Meds:    Current Outpatient Prescriptions:   •  aspirin 81 MG chewable tablet, Chew 1 tablet Daily., Disp: 30 tablet, Rfl: 11  •  atorvastatin (LIPITOR) 40 MG tablet, Take 1 tablet by mouth Daily., Disp: 30 tablet, Rfl: 3  •  baclofen (LIORESAL) 10 MG tablet, Take 1 tablet by mouth 3 (Three) Times a Day., Disp: 90 tablet, Rfl: 3  •  levETIRAcetam (KEPPRA) 1000 MG tablet, Take 1 tablet by mouth Every 12 (Twelve) Hours., Disp: 60 tablet, Rfl: 3  •  levothyroxine (SYNTHROID, LEVOTHROID) 25 MCG tablet, Take 1 tablet by mouth Daily., Disp: 30 tablet, Rfl: 3  •  raNITIdine (ZANTAC) 150 MG tablet, Take 1 tablet by mouth Every Night., Disp: 60 tablet, Rfl: 11    Allergies:  Penicillins    Family Hx:  Family History   Problem Relation Age of Onset   • Cancer Other    • Diabetes Other    • Heart disease Other    • Hypertension Other         Social History:  Social History     Social History   • Marital status: Single     Spouse name: N/A   • Number of children: N/A   • Years of education: N/A     Occupational History   • Not on file.     Social History Main Topics   • Smoking status: Current Every Day Smoker     Packs/day: 0.50     Years: 30.00     Types: Cigarettes   • Smokeless tobacco: Not on file   • Alcohol use No   • Drug use: No   • Sexual activity: Not on file     Other Topics Concern   • Not on file     Social History Narrative   • No narrative on file       Review of Systems  Review of Systems   Constitutional: Negative for activity change and appetite change.   HENT: Negative for congestion and dental problem.    Eyes: Negative for discharge and itching.   Respiratory: Negative for apnea.    Cardiovascular: Negative for chest pain and leg swelling.  "  Gastrointestinal: Negative for abdominal distention and abdominal pain.   Endocrine: Negative for cold intolerance.   Genitourinary: Positive for flank pain. Negative for difficulty urinating, dysuria and enuresis.   Musculoskeletal: Negative for arthralgias and back pain.   Allergic/Immunologic: Negative for environmental allergies and food allergies.   Neurological: Negative for dizziness, facial asymmetry and speech difficulty.   Hematological: Negative for adenopathy.   Psychiatric/Behavioral: Negative for agitation and behavioral problems.         Objective:     /78   Pulse 115   Ht 188 cm (74\")   Wt 111 kg (245 lb)   SpO2 95%   BMI 31.46 kg/m²     General:  alert, appears stated age and cooperative. Sitting comfortably in chair   Oropharynx: lips, mucosa, and tongue normal; teeth and gums normal    Eyes:  conjunctivae/corneas clear. PERRL, EOM's intact. Fundi benign.    Ears:  normal TM's and external ear canals both ears   Neck: no adenopathy, no carotid bruit, no JVD, supple, symmetrical, trachea midline and thyroid not enlarged, symmetric, no tenderness/mass/nodules   Thyroid:  no palpable nodule   Lung: clear to auscultation bilaterally   Heart:  regular rate and rhythm, S1, S2 normal, no murmur, click, rub or gallop   Abdomen: soft, non-tender; bowel sounds normal; no masses,  no organomegaly   Extremities: Right flank tender.     Skin: warm and dry, no hyperpigmentation, vitiligo, or suspicious lesions   Pulses: 2+ and symmetric   Neuro: normal without focal findings, mental status, speech normal, alert and oriented x3 and reflexes normal and symmetric     Office Visit on 02/21/2018   Component Date Value Ref Range Status   • Glucose 02/21/2018 88  60 - 100 mg/dL Final   • BUN 02/21/2018 12  7 - 21 mg/dL Final   • Creatinine 02/21/2018 0.86  0.70 - 1.30 mg/dL Final   • Sodium 02/21/2018 143  137 - 145 mmol/L Final   • Potassium 02/21/2018 4.0  3.5 - 5.1 mmol/L Final   • Chloride 02/21/2018 " 104  95 - 110 mmol/L Final   • CO2 02/21/2018 25.0  22.0 - 31.0 mmol/L Final   • Calcium 02/21/2018 9.5  8.4 - 10.2 mg/dL Final   • Total Protein 02/21/2018 7.9  6.3 - 8.6 g/dL Final   • Albumin 02/21/2018 4.30  3.40 - 4.80 g/dL Final   • ALT (SGPT) 02/21/2018 49  21 - 72 U/L Final   • AST (SGOT) 02/21/2018 33  17 - 59 U/L Final   • Alkaline Phosphatase 02/21/2018 100  38 - 126 U/L Final   • Total Bilirubin 02/21/2018 0.8  0.2 - 1.3 mg/dL Final   • eGFR Non African Amer 02/21/2018 92  56 - 130 mL/min/1.73 Final   • Globulin 02/21/2018 3.6* 2.3 - 3.5 gm/dL Final   • A/G Ratio 02/21/2018 1.2  1.1 - 1.8 g/dL Final   • BUN/Creatinine Ratio 02/21/2018 14.0  7.0 - 25.0 Final   • Anion Gap 02/21/2018 14.0  5.0 - 15.0 mmol/L Final   • TSH 02/21/2018 1.910  0.460 - 4.680 mIU/mL Final     9/12/2016 CT abd/pelvis without contrast  CONCLUSION-   1.  Left hydronephrosis and hydroureter due to an 8 mm calculus in the mid left ureter.  2.  Multiple bilateral nonobstructing renal calculi.  3.  Heterogeneous sclerotic lesions in the femoral heads concerning for bone infarcts.  4.  Right lower lobe subpleural nodule measuring 5 mm. Recommend follow-up chest CT in 12 months for a low risk patient, six months for a high-risk patient (see below).      Recommendations for follow-up and management of nodules detected incidentally at nonscreening CT.       LOW RISK PATIENT (Minimal or absent history of smoking and of other known risk factors)   Nodule 4 mm or smaller- No further follow-up is needed.   Nodule greater than 4 mm to 6 mm- Follow-up CT at 12 months.   Nodule greater than 6 mm to 8 mm- Follow-up CT at 6 to 12 months.   Nodule greater than 8 mm- PET-CT scan, follow-up CT at 3 months, and/or consultation for biopsy recommended.       HIGH RISK PATIENT (History of smoking or of other know risk factors)   Nodule 4 mm or smaller- Follow-up CT at 12 months.   Nodule greater than 4 mm to 6 mm- Follow-up CT at 6 to 12 months   Nodule  greater than 6 mm to 8 mm- Follow-up CT at 3 to 6 months.   Nodule greater than 8 mm- PET-CT scan, follow-up CT at 3 months,  and/or consultation for biopsy recommended.       Based on Fleischner Society Statement on Management of CT Detected  Pulmonary Nodules. Morse et al, Radiology 2005;[id number].     Electronically Signed By- Morteza Daniels MD On: 2016-09-12 15:09:34     2/21/2018 Two view sacrum and coccyx  HISTORY: Low back pain. Right flank pain.     AP and lateral views obtained.     COMPARISON: None. Correlation CT abdomen and pelvis September 12, 2016.     No fracture or dislocation.  Degenerative disc disease and spondylotic change L5-S1.  Osteonecrosis each femoral head as demonstrated on prior CT.  No other osseous or articular abnormality.     Minimal atherosclerotic calcification abdominal aorta and iliac arteries.     IMPRESSION:  CONCLUSION:  Degenerative disc disease and spondylotic change L5-S1.  Osteonecrosis each femoral head as demonstrated on prior CT.      Assessment/Plan:     Martin was seen today for flank pain.    Diagnoses and all orders for this visit:    Osteonecrosis of femur, unspecified laterality  -     MRI hip right wo contrast; Future  -     MRI hip left wo contrast; Future    Cerebrovascular accident (CVA), unspecified mechanism  -     aspirin 81 MG chewable tablet; Chew 1 tablet Daily.    Dyslipidemia  -     atorvastatin (LIPITOR) 40 MG tablet; Take 1 tablet by mouth Daily.    Chronic bilateral low back pain without sciatica  -     baclofen (LIORESAL) 10 MG tablet; Take 1 tablet by mouth 3 (Three) Times a Day.    Seizure disorder  -     levETIRAcetam (KEPPRA) 1000 MG tablet; Take 1 tablet by mouth Every 12 (Twelve) Hours.    Hashimoto's thyroiditis  -     levothyroxine (SYNTHROID, LEVOTHROID) 25 MCG tablet; Take 1 tablet by mouth Daily.    Gastroesophageal reflux disease, esophagitis presence not specified  -     raNITIdine (ZANTAC) 150 MG tablet; Take 1 tablet by mouth  Every Night.    Return for Recheck, after MRI.    Goals     • Less pain            Barriers:  none            Preventative:  Male Preventative: Last colonoscopy was done 2015.  Smoking cessation counseling was provided. Tobacco dependence:  Counseled, 3-10 minutes spent, asymptomatic. Still pre-contemplative.  Discussed particular importance in light of his osteonecrosis seen on the X-ray and CT.  does not drink  eat more fruits and vegetables, decrease soda or juice intake, increase water intake, increase physical activity and have 3 meals a day    RISK SCORE: 4          This document has been electronically signed by Branden Lin MD on March 22, 2018 7:37 PM

## 2018-04-05 ENCOUNTER — HOSPITAL ENCOUNTER (OUTPATIENT)
Dept: MRI IMAGING | Facility: HOSPITAL | Age: 58
Discharge: HOME | End: 2018-04-05
Attending: FAMILY MEDICINE | Admitting: FAMILY MEDICINE

## 2018-04-05 ENCOUNTER — HOSPITAL ENCOUNTER (OUTPATIENT)
Dept: MRI IMAGING | Facility: HOSPITAL | Age: 58
Discharge: HOME | End: 2018-04-05
Attending: FAMILY MEDICINE

## 2018-04-05 DIAGNOSIS — M87.256: ICD-10-CM

## 2018-04-05 PROCEDURE — 73721 MRI JNT OF LWR EXTRE W/O DYE: CPT

## 2018-06-29 ENCOUNTER — TELEPHONE (OUTPATIENT)
Dept: FAMILY MEDICINE CLINIC | Facility: CLINIC | Age: 58
End: 2018-06-29

## 2018-06-29 DIAGNOSIS — M87.052 AVASCULAR NECROSIS OF BONES OF BOTH HIPS (HCC): Primary | ICD-10-CM

## 2018-06-29 DIAGNOSIS — M87.051 AVASCULAR NECROSIS OF BONES OF BOTH HIPS (HCC): Primary | ICD-10-CM

## 2018-06-29 NOTE — TELEPHONE ENCOUNTER
Discussed with Dr Santa, will refer to Ortho.  Unable to reach patient to discuss results, left voicemail.          This document has been electronically signed by Branden Lin MD on June 29, 2018 12:31 PM

## 2018-07-13 DIAGNOSIS — M25.551 BILATERAL HIP PAIN: Primary | ICD-10-CM

## 2018-07-13 DIAGNOSIS — M25.552 BILATERAL HIP PAIN: Primary | ICD-10-CM

## 2018-07-16 ENCOUNTER — OFFICE VISIT (OUTPATIENT)
Dept: ORTHOPEDIC SURGERY | Facility: CLINIC | Age: 58
End: 2018-07-16

## 2018-07-16 VITALS — BODY MASS INDEX: 31.44 KG/M2 | WEIGHT: 245 LBS | HEIGHT: 74 IN

## 2018-07-16 DIAGNOSIS — M25.551 BILATERAL HIP PAIN: Primary | ICD-10-CM

## 2018-07-16 DIAGNOSIS — M87.051 AVASCULAR NECROSIS OF BONE OF RIGHT HIP (HCC): ICD-10-CM

## 2018-07-16 DIAGNOSIS — M51.36 DDD (DEGENERATIVE DISC DISEASE), LUMBAR: ICD-10-CM

## 2018-07-16 DIAGNOSIS — M25.552 BILATERAL HIP PAIN: Primary | ICD-10-CM

## 2018-07-16 DIAGNOSIS — M87.052 AVASCULAR NECROSIS OF BONE OF LEFT HIP (HCC): ICD-10-CM

## 2018-07-16 PROCEDURE — 99203 OFFICE O/P NEW LOW 30 MIN: CPT | Performed by: ORTHOPAEDIC SURGERY

## 2018-07-16 NOTE — PROGRESS NOTES
Martin Bender is a 57 y.o. male   Primary provider:  Monica Wilkinson MD       Chief Complaint   Patient presents with   • Right Hip - Pain   • Left Hip - Pain   MRI @ [facility] bilateral hips    X-rays done today in office    HISTORY OF PRESENT ILLNESS: bilateral hip pain right greater than left   Pain scale today R>L 8/10    Pain   This is a chronic problem. The current episode started more than 1 year ago. The problem occurs constantly. The problem has been unchanged. Associated symptoms include joint swelling. Pertinent negatives include no abdominal pain, chest pain, chills, congestion, coughing, diaphoresis, headaches or numbness. Associated symptoms comments: Bilateral hip pain . The symptoms are aggravated by walking and standing (Patient states hips hurt all the time no matter the activity ). He has tried rest for the symptoms.   58 y/o M c/o back pain, the pain is sharp.  The pain is constant at times.  The pain rates 5-6/ 10.  The pain is burning.  The pain occasionally goes away.       CONCURRENT MEDICAL HISTORY:    Past Medical History:   Diagnosis Date   • Age related cataract     right   • Artificial lens present     in position   • Dyslipidemia    • Encounter for general adult medical examination without abnormal findings    • Gastroesophageal reflux disease    • Hashimoto's thyroiditis    • Headache    • Kidney stone    • Primary hyperparathyroidism (CMS/HCC)     possible primary normocalcemic but vit D has to be corrected first   • Secondary hyperparathyroidism (CMS/HCC)    • Seizure disorder (CMS/HCC)        Allergies   Allergen Reactions   • Penicillins Rash         Current Outpatient Prescriptions:   •  aspirin 81 MG chewable tablet, Chew 1 tablet Daily., Disp: 30 tablet, Rfl: 11  •  atorvastatin (LIPITOR) 40 MG tablet, Take 1 tablet by mouth Daily., Disp: 30 tablet, Rfl: 3  •  baclofen (LIORESAL) 10 MG tablet, Take 1 tablet by mouth 3 (Three) Times a Day., Disp: 90 tablet, Rfl: 3  •   "levETIRAcetam (KEPPRA) 1000 MG tablet, Take 1 tablet by mouth Every 12 (Twelve) Hours., Disp: 60 tablet, Rfl: 3  •  levothyroxine (SYNTHROID, LEVOTHROID) 25 MCG tablet, Take 1 tablet by mouth Daily., Disp: 30 tablet, Rfl: 3  •  raNITIdine (ZANTAC) 150 MG tablet, Take 1 tablet by mouth Every Night., Disp: 60 tablet, Rfl: 11    Past Surgical History:   Procedure Laterality Date   • COLONOSCOPY  2015   • CYSTOSCOPY  01/11/2016    Left ureteroscopy with laser lithotripsy.Left retrograde pyelography.Left double J stent insertion, double J stent with no tether. [facility]. Per patient had stone removal \"25 times\"       Family History   Problem Relation Age of Onset   • Cancer Other    • Diabetes Other    • Heart disease Other    • Hypertension Other        Social History     Social History   • Marital status:      Spouse name: N/A   • Number of children: N/A   • Years of education: N/A     Occupational History   • Not on file.     Social History Main Topics   • Smoking status: Current Every Day Smoker     Packs/day: 0.50     Years: 30.00     Types: Cigarettes   • Smokeless tobacco: Not on file   • Alcohol use No   • Drug use: No   • Sexual activity: Not on file     Other Topics Concern   • Not on file     Social History Narrative   • No narrative on file        Review of Systems   Constitutional: Negative.  Negative for activity change, appetite change, chills and diaphoresis.   HENT: Negative.  Negative for congestion, dental problem, drooling, ear discharge and ear pain.    Eyes: Negative.  Negative for pain, discharge, redness and itching.   Respiratory: Negative.  Negative for apnea, cough, choking and chest tightness.    Cardiovascular: Negative.  Negative for chest pain and leg swelling.   Gastrointestinal: Negative.  Negative for abdominal distention, abdominal pain, anal bleeding, blood in stool and constipation.   Endocrine: Negative.  Negative for cold intolerance, heat intolerance and " "polydipsia.   Genitourinary: Negative.  Negative for difficulty urinating, dysuria, enuresis, flank pain and frequency.   Musculoskeletal: Positive for joint swelling.   Skin: Negative.    Neurological: Negative.  Negative for dizziness, facial asymmetry, light-headedness, numbness and headaches.   Psychiatric/Behavioral: Negative.  Negative for agitation, behavioral problems and confusion.       PHYSICAL EXAMINATION:       Ht 188 cm (74\")   Wt 111 kg (245 lb)   BMI 31.46 kg/m²     Physical Exam   Constitutional: He is oriented to person, place, and time. He appears well-developed and well-nourished. No distress.   HENT:   Head: Normocephalic and atraumatic.   Mouth/Throat: No oropharyngeal exudate.   Eyes: Left eye exhibits no discharge. No scleral icterus.   Neck: Neck supple. No JVD present. No tracheal deviation present. No thyromegaly present.   Cardiovascular: Normal rate and intact distal pulses.    No murmur heard.  Pulmonary/Chest: Effort normal. No respiratory distress. He has no wheezes. He has no rales.   Abdominal: Soft. He exhibits no distension. There is no tenderness. There is no guarding.   Neurological: He is alert and oriented to person, place, and time. He displays normal reflexes. No cranial nerve deficit. Coordination normal.   Skin: Skin is warm and dry. Capillary refill takes less than 2 seconds. No rash noted. No erythema. No pallor.   Psychiatric: He has a normal mood and affect.       GAIT:     []  Normal  []  Antalgic    Assistive device: []  None  []  Walker     []  Crutches  []  Cane     []  Wheelchair  []  Stretcher    Right Hip Exam     Tenderness   The patient is experiencing tenderness in the anterior.    Range of Motion   Extension: -10   Flexion: 130   Abduction: 25     Muscle Strength   Abduction: 5/5   Adduction: 5/5   Flexion: 5/5     Tests   SAVANNAH: negative  Fadir:  Negative FADIR test    Other   Erythema: absent  Scars: absent  Sensation: normal      Left Hip Exam "     Tenderness   The patient is experiencing tenderness in the anterior.    Range of Motion   Extension: -10   Flexion: 130   Abduction: 25     Muscle Strength   Abduction: 5/5   Adduction: 5/5   Flexion: 5/5     Tests   SAVANNAH: negative  Fadir:  Negative FADIR test    Other   Erythema: absent  Scars: absent  Sensation: normal      Back Exam     Tenderness   The patient is experiencing tenderness in the lumbar.    Range of Motion   Extension: 10   Flexion: 50   Lateral Bend Right: 10   Lateral Bend Left: 10   Rotation Right: 10   Rotation Left: 10     Muscle Strength   Right Quadriceps:  5/5   Left Quadriceps:  5/5   Right Hamstrings:  5/5   Left Hamstrings:  5/5     Tests   Straight leg raise right: negative  Straight leg raise left: negative    Reflexes   Patellar: 1/4    Other   Sensation: normal  Gait: normal   Erythema: no back redness  Scars: absent                  MRI right hip. MRI left hip.     HISTORY: Hip pain.     Prior exam: CT abdomen pelvis September 12, 2016.     TECHNIQUE: Multiplanar multisequence noncontrast images right and  left hips and pelvis.     FINDINGS: Bilateral areas of avascular necrosis right and left  femoral heads without fragmentation or collapse. These findings  have a similar appearance in comparison with images from CT  examination September 12, 2016.     The right and left femoral necks are normal.     The visualized portions of the bony pelvis are normal. There are  no pelvic soft tissue masses. There are no joint effusions.     IMPRESSION:  CONCLUSION: Chronic bilateral areas of avascular necrosis of both  right and left femoral head similar appearance to prior CT images  dated September 12, 2016. No evidence of any fragmentation or  collapse.     No bone edema in the right and left femoral necks. No joint  effusion. MRI right hip, left hip and pelvis otherwise  unremarkable.     Electronically signed by:  Cooper Edmond MD  4/6/2018 1:26 PM CDT  Workstation: [id number]    MRI right  hip. MRI left hip.     HISTORY: Hip pain.     Prior exam: CT abdomen pelvis September 12, 2016.     TECHNIQUE: Multiplanar multisequence noncontrast images right and  left hips and pelvis.     FINDINGS: Bilateral areas of avascular necrosis right and left  femoral heads without fragmentation or collapse. These findings  have a similar appearance in comparison with images from CT  examination September 12, 2016.     The right and left femoral necks are normal.     The visualized portions of the bony pelvis are normal. There are  no pelvic soft tissue masses. There are no joint effusions.     IMPRESSION:  CONCLUSION: Chronic bilateral areas of avascular necrosis of both  right and left femoral head similar appearance to prior CT images  dated September 12, 2016. No evidence of any fragmentation or  collapse.     No bone edema in the right and left femoral necks. No joint  effusion. MRI right hip, left hip and pelvis otherwise  unremarkable.     Electronically signed by:  Cooper Edmond MD  4/6/2018 1:26 PM CDT  Workstation: [id number]    I reviewed the MRI of the right and left hip, the femoral head has avascular necrosis on the right and left sides.    Xray of the right and left hips- avascular necrosis of the right and left femoral heads.    Lumbar spine xray 2/21/18, lumbar degenerative disc disease of L5-S1      ASSESSMENT:    Diagnoses and all orders for this visit:    Bilateral hip pain    DDD (degenerative disc disease), lumbar    Avascular necrosis of bone of right hip (CMS/HCC)    Avascular necrosis of bone of left hip (CMS/HCC)          PLAN    Recommend MRI of lumbar spine.      Royal Suarez MD

## 2018-08-17 DIAGNOSIS — G40.909 SEIZURE DISORDER (HCC): ICD-10-CM

## 2018-08-17 DIAGNOSIS — E78.5 DYSLIPIDEMIA: ICD-10-CM

## 2018-08-17 DIAGNOSIS — E06.3 HASHIMOTO'S THYROIDITIS: ICD-10-CM

## 2018-08-17 DIAGNOSIS — G89.29 CHRONIC BILATERAL LOW BACK PAIN WITHOUT SCIATICA: ICD-10-CM

## 2018-08-17 DIAGNOSIS — M54.50 CHRONIC BILATERAL LOW BACK PAIN WITHOUT SCIATICA: ICD-10-CM

## 2018-08-20 ENCOUNTER — TELEPHONE (OUTPATIENT)
Dept: FAMILY MEDICINE CLINIC | Facility: CLINIC | Age: 58
End: 2018-08-20

## 2018-08-20 NOTE — TELEPHONE ENCOUNTER
Pt called said that he has called several times in regards to getting refills.   He stated that [facility] has also sent over request as well.       Pt is out of Keppra and has been for 5 days, wanted to get that as soon as could

## 2018-08-20 NOTE — TELEPHONE ENCOUNTER
PATIENT CALLED TODAY ABOUT HIS KEPPRA. HE CALLED LAST WEEK AND HAS BEEN OUT OF IT FOR OVER 5 DAYS. HE'S AFRAID HE'LL HAVE A SEIZURE.  PLEASE CALL IN TO [facility] AS SOON AS POSSIBLE. HE IS SCHEDULED TO COME IN Friday AS A WALK IN TO BE CHECKED FOR HIS SEIZURES THEN SCHEDULED FOR EST WITH YOU AS HE WAS A [facility] PATIENT.     THANK YOU

## 2018-08-21 RX ORDER — BACLOFEN 10 MG/1
10 TABLET ORAL 3 TIMES DAILY
Qty: 90 TABLET | Refills: 3 | Status: SHIPPED | OUTPATIENT
Start: 2018-08-21 | End: 2018-08-24 | Stop reason: SDUPTHER

## 2018-08-21 RX ORDER — ATORVASTATIN CALCIUM 40 MG/1
40 TABLET, FILM COATED ORAL DAILY
Qty: 30 TABLET | Refills: 3 | Status: SHIPPED | OUTPATIENT
Start: 2018-08-21 | End: 2018-08-24 | Stop reason: SDUPTHER

## 2018-08-21 RX ORDER — LEVOTHYROXINE SODIUM 0.03 MG/1
25 TABLET ORAL DAILY
Qty: 30 TABLET | Refills: 3 | Status: SHIPPED | OUTPATIENT
Start: 2018-08-21 | End: 2018-08-24 | Stop reason: SDUPTHER

## 2018-08-21 RX ORDER — LEVETIRACETAM 1000 MG/1
1000 TABLET ORAL EVERY 12 HOURS SCHEDULED
Qty: 60 TABLET | Refills: 3 | Status: SHIPPED | OUTPATIENT
Start: 2018-08-21 | End: 2019-01-21 | Stop reason: SDUPTHER

## 2018-08-24 ENCOUNTER — OFFICE VISIT (OUTPATIENT)
Dept: FAMILY MEDICINE CLINIC | Facility: CLINIC | Age: 58
End: 2018-08-24

## 2018-08-24 VITALS
OXYGEN SATURATION: 98 % | HEART RATE: 98 BPM | DIASTOLIC BLOOD PRESSURE: 82 MMHG | WEIGHT: 243.1 LBS | HEIGHT: 74 IN | BODY MASS INDEX: 31.2 KG/M2 | SYSTOLIC BLOOD PRESSURE: 132 MMHG

## 2018-08-24 DIAGNOSIS — M54.50 CHRONIC BILATERAL LOW BACK PAIN WITHOUT SCIATICA: ICD-10-CM

## 2018-08-24 DIAGNOSIS — E78.5 DYSLIPIDEMIA: ICD-10-CM

## 2018-08-24 DIAGNOSIS — E21.3 HYPERPARATHYROIDISM (HCC): ICD-10-CM

## 2018-08-24 DIAGNOSIS — I63.9 CEREBROVASCULAR ACCIDENT (CVA), UNSPECIFIED MECHANISM (HCC): ICD-10-CM

## 2018-08-24 DIAGNOSIS — R56.9 SEIZURE (HCC): Primary | ICD-10-CM

## 2018-08-24 DIAGNOSIS — K21.9 GASTROESOPHAGEAL REFLUX DISEASE, ESOPHAGITIS PRESENCE NOT SPECIFIED: ICD-10-CM

## 2018-08-24 DIAGNOSIS — E06.3 HASHIMOTO'S THYROIDITIS: ICD-10-CM

## 2018-08-24 DIAGNOSIS — G89.29 CHRONIC BILATERAL LOW BACK PAIN WITHOUT SCIATICA: ICD-10-CM

## 2018-08-24 PROCEDURE — 99213 OFFICE O/P EST LOW 20 MIN: CPT | Performed by: STUDENT IN AN ORGANIZED HEALTH CARE EDUCATION/TRAINING PROGRAM

## 2018-08-24 RX ORDER — ASPIRIN 81 MG/1
81 TABLET, CHEWABLE ORAL DAILY
Qty: 30 TABLET | Refills: 11 | Status: SHIPPED | OUTPATIENT
Start: 2018-08-24 | End: 2019-02-26 | Stop reason: SDUPTHER

## 2018-08-24 RX ORDER — LEVOTHYROXINE SODIUM 0.03 MG/1
25 TABLET ORAL DAILY
Qty: 30 TABLET | Refills: 3 | Status: SHIPPED | OUTPATIENT
Start: 2018-08-24 | End: 2019-02-26 | Stop reason: SDUPTHER

## 2018-08-24 RX ORDER — BACLOFEN 10 MG/1
10 TABLET ORAL 3 TIMES DAILY
Qty: 90 TABLET | Refills: 3 | Status: SHIPPED | OUTPATIENT
Start: 2018-08-24 | End: 2019-02-26 | Stop reason: SDUPTHER

## 2018-08-24 RX ORDER — ATORVASTATIN CALCIUM 40 MG/1
40 TABLET, FILM COATED ORAL DAILY
Qty: 30 TABLET | Refills: 3 | Status: SHIPPED | OUTPATIENT
Start: 2018-08-24 | End: 2019-02-26 | Stop reason: SDUPTHER

## 2018-08-24 RX ORDER — RANITIDINE 150 MG/1
150 TABLET ORAL NIGHTLY
Qty: 60 TABLET | Refills: 11 | Status: SHIPPED | OUTPATIENT
Start: 2018-08-24 | End: 2019-02-26 | Stop reason: SDUPTHER

## 2018-08-24 NOTE — PROGRESS NOTES
"     Family Medicine Residency   Monica Wilkinson MD    Martin Bender is a 57 y.o. male who presents to family medicine residency clinic for the following:    PROBLEM LIST:  1. Cerebrovascular Accident  2. GERD  3. Hashimoto's thyroiditis  4. Primary Hyperparathyroidism  5. Seizure Disorder  6. Bilateral Hip Pain with avascular necrosis of right hip  7. Renal Calculi  8. Dyslipidemia  9. Tobacco Dependence    Hip Pain  He is currently being evaluated by orthopedics. The pain started 6-8 months ago and it is getting progressively worse. He describes the pain as a burning, pinching and poking pain. It is 7/10 and constant in nature. It is worse with standing and working. The pain is improved when he is distracted. He does not take anything for the pain. No bowel or bladder symptoms. He does have weakness in the opposite leg.     Of note, he has a history of stroke 3-4 years ago. He has a history of kidney stones and has received 26 lithotripsy treatments according to him.        Past Medical Hx:   Past Medical History:   Diagnosis Date   • Age related cataract     right   • Artificial lens present     in position   • Dyslipidemia    • Encounter for general adult medical examination without abnormal findings    • Gastroesophageal reflux disease    • Hashimoto's thyroiditis    • Headache    • Kidney stone    • Primary hyperparathyroidism (CMS/HCC)     possible primary normocalcemic but vit D has to be corrected first   • Secondary hyperparathyroidism (CMS/HCC)    • Seizure disorder (CMS/HCC)        Past Surgical Hx:  Past Surgical History:   Procedure Laterality Date   • COLONOSCOPY  2015   • CYSTOSCOPY  01/11/2016    Left ureteroscopy with laser lithotripsy.Left retrograde pyelography.Left double J stent insertion, double J stent with no tether. [facility]. Per patient had stone removal \"25 times\"       Current Meds:    Current Outpatient Prescriptions:   •  aspirin 81 MG chewable tablet, " Chew 1 tablet Daily., Disp: 30 tablet, Rfl: 11  •  atorvastatin (LIPITOR) 40 MG tablet, Take 1 tablet by mouth Daily., Disp: 30 tablet, Rfl: 3  •  baclofen (LIORESAL) 10 MG tablet, Take 1 tablet by mouth 3 (Three) Times a Day., Disp: 90 tablet, Rfl: 3  •  levETIRAcetam (KEPPRA) 1000 MG tablet, TAKE 1 TABLET BY MOUTH EVERY 12 (TWELVE) HOURS., Disp: 60 tablet, Rfl: 3  •  levothyroxine (SYNTHROID, LEVOTHROID) 25 MCG tablet, Take 1 tablet by mouth Daily., Disp: 30 tablet, Rfl: 3  •  raNITIdine (ZANTAC) 150 MG tablet, Take 1 tablet by mouth Every Night., Disp: 60 tablet, Rfl: 11    Allergies:  Penicillins    Family Hx:  Family History   Problem Relation Age of Onset   • Cancer Other    • Diabetes Other    • Heart disease Other    • Hypertension Other         Social History:  Social History     Social History   • Marital status:      Spouse name: N/A   • Number of children: N/A   • Years of education: N/A     Occupational History   • Not on file.     Social History Main Topics   • Smoking status: Current Every Day Smoker     Packs/day: 0.50     Years: 30.00     Types: Cigarettes   • Smokeless tobacco: Never Used   • Alcohol use No   • Drug use: No   • Sexual activity: Not on file     Other Topics Concern   • Not on file     Social History Narrative   • No narrative on file       Review of Systems  Review of Systems   Constitutional: Negative for chills, diaphoresis and fever.   HENT: Negative for sneezing and sore throat.    Eyes: Negative for pain and discharge.   Respiratory: Negative for cough and shortness of breath.    Gastrointestinal: Negative for constipation, diarrhea, nausea and vomiting.   Endocrine: Negative for cold intolerance and heat intolerance.   Genitourinary: Negative for difficulty urinating, dysuria, frequency and urgency.   Musculoskeletal: Negative for arthralgias and myalgias.        Hip Pain   Skin: Negative for color change and pallor.   Allergic/Immunologic: Negative for environmental  allergies and food allergies.   Neurological: Negative for dizziness, syncope and weakness.   Psychiatric/Behavioral: Negative for confusion and sleep disturbance.       Physical Exam:  Vitals:    08/24/18 0954   BP: 132/82   Pulse: 98   SpO2: 98%       Body mass index is 31.21 kg/m².   Physical Exam   Constitutional: He is oriented to person, place, and time. He appears well-developed and well-nourished. No distress.   HENT:   Head: Normocephalic and atraumatic.   Nose: Nose normal.   Eyes: Conjunctivae and EOM are normal.   Neck: Normal range of motion. Neck supple.   Cardiovascular: Normal rate, regular rhythm and normal heart sounds.    No murmur heard.  Pulmonary/Chest: Effort normal and breath sounds normal. No respiratory distress. He has no wheezes. He has no rales.   Abdominal: Soft. Bowel sounds are normal. He exhibits no distension. There is no tenderness. There is no guarding.   Musculoskeletal: Normal range of motion.   Pain in hips   Neurological: He is alert and oriented to person, place, and time.   Skin: Skin is warm and dry.   Psychiatric: He has a normal mood and affect. His behavior is normal.   Vitals reviewed.        Data Reviewed:     LABS:   Lab Results   Component Value Date    GLUCOSE 88 02/21/2018    BUN 12 02/21/2018    CREATININE 0.86 02/21/2018    EGFRIFNONA 92 02/21/2018    BCR 14.0 02/21/2018    K 4.0 02/21/2018    CO2 25.0 02/21/2018    CALCIUM 9.5 02/21/2018    ALBUMIN 4.30 02/21/2018    AST 33 02/21/2018    ALT 49 02/21/2018     Lab Results   Component Value Date    WBC 8.31 07/11/2017    HGB 16.4 07/11/2017    HCT 47.4 07/11/2017    MCV 95.4 07/11/2017     07/11/2017     Lab Results   Component Value Date    CHOL 230 (H) 07/11/2017    TRIG 509 (H) 07/11/2017    HDL 29 (L) 07/11/2017     (H) 07/11/2017     Lab Results   Component Value Date    TSH 1.910 02/21/2018     No results found for: HGBA1C  Lab Results   Component Value Date    CREATININE 0.86 02/21/2018        Assessment/Plan     1. Hip Pain- avascular necrosis of bone of right hip   -He is established with orthopedics and currently being worked-up  -I told him to make an another appointment for follow-up and evaluation    2. History of Hashimoto's and Primary Hyperparathyroidism  -Will check labs today as listed below  -Follow up in 3 months     3. Weight:  -Class: Obese Class I: 30-34.9kg/m2  -Patient's Body mass index is 31.21 kg/m². BMI is above normal parameters. Recommendations include: nutrition counseling.      4. Nicotine status:  reports that he has been smoking Cigarettes.  He has a 15.00 pack-year smoking history. He has never used smokeless tobacco.   I advised Martin of the risks of continuing to use tobacco, and I provided him with tobacco cessation educational materials in the After Visit Summary.     During this visit, I spent 5 minutes counseling the patient regarding tobacco cessation.    5. Alcohol use:  reports that he does not drink alcohol.    6. Health Maintenance:   Health Maintenance   Topic Date Due   • ANNUAL PHYSICAL  10/01/1963   • ZOSTER VACCINE (1 of 2) 10/01/2011   • INFLUENZA VACCINE  08/01/2018   • COLONOSCOPY  01/01/2025   • TDAP/TD VACCINES (2 - Td) 01/28/2026   • PNEUMOCOCCAL VACCINE (19-64 MEDIUM RISK)  Completed   • HEPATITIS C SCREENING  Completed       FOLLOW-UP  Return in about 3 months (around 11/24/2018) for Recheck.      ORDERS  Medications Discontinued During This Encounter   Medication Reason   • raNITIdine (ZANTAC) 150 MG tablet Reorder   • atorvastatin (LIPITOR) 40 MG tablet Reorder   • aspirin 81 MG chewable tablet Reorder   • baclofen (LIORESAL) 10 MG tablet Reorder   • levothyroxine (SYNTHROID, LEVOTHROID) 25 MCG tablet Reorder     Martin was seen today for establish care.    Diagnoses and all orders for this visit:    Seizure (CMS/HCC)  -     Levetiracetam Level (Keppra); Future    Gastroesophageal reflux disease, esophagitis presence not specified  -      raNITIdine (ZANTAC) 150 MG tablet; Take 1 tablet by mouth Every Night.    Dyslipidemia  -     atorvastatin (LIPITOR) 40 MG tablet; Take 1 tablet by mouth Daily.  -     Lipid panel; Future    Cerebrovascular accident (CVA), unspecified mechanism (CMS/HCC)  -     aspirin 81 MG chewable tablet; Chew 1 tablet Daily.    Chronic bilateral low back pain without sciatica  -     baclofen (LIORESAL) 10 MG tablet; Take 1 tablet by mouth 3 (Three) Times a Day.    Hashimoto's thyroiditis  -     levothyroxine (SYNTHROID, LEVOTHROID) 25 MCG tablet; Take 1 tablet by mouth Daily.  -     TSH; Future  -     T4, free; Future    Hyperparathyroidism (CMS/HCC)  -     PTH, Intact & Calcium; Future  -     Vitamin D 25 hydroxy; Future              This document has been electronically signed by Monica Wilkinson MD on August 24, 2018 11:10 AM

## 2018-08-24 NOTE — PATIENT INSTRUCTIONS
Health Risks of Smoking  Smoking cigarettes is very bad for your health. Tobacco smoke has over 200 known poisons in it. It contains the poisonous gases nitrogen oxide and carbon monoxide. There are over 60 chemicals in tobacco smoke that cause cancer.  Smoking is difficult to quit because a chemical in tobacco, called nicotine, causes addiction or dependence. When you smoke and inhale, nicotine is absorbed rapidly into the bloodstream through your lungs. Both inhaled and non-inhaled nicotine may be addictive.  What are the risks of cigarette smoke?  Cigarette smokers have an increased risk of many serious medical problems, including:  · Lung cancer.  · Lung disease, such as pneumonia, bronchitis, and emphysema.  · Chest pain (angina) and heart attack because the heart is not getting enough oxygen.  · Heart disease and peripheral blood vessel disease.  · High blood pressure (hypertension).  · Stroke.  · Oral cancer, including cancer of the lip, mouth, or voice box.  · Bladder cancer.  · Pancreatic cancer.  · Cervical cancer.  · Pregnancy complications, including premature birth.  · Stillbirths and smaller  babies, birth defects, and genetic damage to sperm.  · Early menopause.  · Lower estrogen level for women.  · Infertility.  · Facial wrinkles.  · Blindness.  · Increased risk of broken bones (fractures).  · Senile dementia.  · Stomach ulcers and internal bleeding.  · Delayed wound healing and increased risk of complications during surgery.  · Even smoking lightly shortens your life expectancy by several years.    Because of secondhand smoke exposure, children of smokers have an increased risk of the following:  · Sudden infant death syndrome (SIDS).  · Respiratory infections.  · Lung cancer.  · Heart disease.  · Ear infections.    What are the benefits of quitting?  There are many health benefits of quitting smoking. Here are some of them:  · Within days of quitting smoking, your risk of having a heart  attack decreases, your blood flow improves, and your lung capacity improves. Blood pressure, pulse rate, and breathing patterns start returning to normal soon after quitting.  · Within months, your lungs may clear up completely.  · Quitting for 10 years reduces your risk of developing lung cancer and heart disease to almost that of a nonsmoker.  · People who quit may see an improvement in their overall quality of life.    How do I quit smoking?  Smoking is an addiction with both physical and psychological effects, and longtime habits can be hard to change. Your health care provider can recommend:  · Programs and community resources, which may include group support, education, or talk therapy.  · Prescription medicines to help reduce cravings.  · Nicotine replacement products, such as patches, gum, and nasal sprays. Use these products only as directed. Do not replace cigarette smoking with electronic cigarettes, which are commonly called e-cigarettes. The safety of e-cigarettes is not known, and some may contain harmful chemicals.  · A combination of two or more of these methods.    Where to find more information:  · American Lung Association: www.lung.org  · American Cancer Society: www.cancer.org  Summary  · Smoking cigarettes is very bad for your health. Cigarette smokers have an increased risk of many serious medical problems, including several cancers, heart disease, and stroke.  · Smoking is an addiction with both physical and psychological effects, and longtime habits can be hard to change.  · By stopping right away, you can greatly reduce the risk of medical problems for you and your family.  · To help you quit smoking, your health care provider can recommend programs, community resources, prescription medicines, and nicotine replacement products such as patches, gum, and nasal sprays.  This information is not intended to replace advice given to you by your health care provider. Make sure you discuss any  questions you have with your health care provider.  Document Released: 01/25/2006 Document Revised: 12/22/2017 Document Reviewed: 12/22/2017  [employer] Interactive Patient Education © 2017 [employer]

## 2018-09-12 ENCOUNTER — OFFICE VISIT (OUTPATIENT)
Dept: ORTHOPEDIC SURGERY | Facility: CLINIC | Age: 58
End: 2018-09-12

## 2018-09-12 VITALS — HEIGHT: 74 IN | BODY MASS INDEX: 31.32 KG/M2 | WEIGHT: 244 LBS

## 2018-09-12 DIAGNOSIS — M25.551 BILATERAL HIP PAIN: Primary | ICD-10-CM

## 2018-09-12 DIAGNOSIS — M25.552 BILATERAL HIP PAIN: Primary | ICD-10-CM

## 2018-09-12 DIAGNOSIS — M87.051 AVASCULAR NECROSIS OF BONE OF RIGHT HIP (HCC): ICD-10-CM

## 2018-09-12 DIAGNOSIS — M87.052 AVASCULAR NECROSIS OF BONE OF LEFT HIP (HCC): ICD-10-CM

## 2018-09-12 DIAGNOSIS — M51.36 DDD (DEGENERATIVE DISC DISEASE), LUMBAR: ICD-10-CM

## 2018-09-12 PROCEDURE — 99213 OFFICE O/P EST LOW 20 MIN: CPT | Performed by: ORTHOPAEDIC SURGERY

## 2018-09-12 NOTE — PROGRESS NOTES
"Martin Bender is a 57 y.o. male returns for     Chief Complaint   Patient presents with   • Right Hip - Follow-up   • Left Hip - Follow-up       HISTORY OF PRESENT ILLNESS: f/u on bilateral hip pain, patient has no complaints      57 y low back pain, the pain is located in the low back and right flank, this is a burning, itching type pain.  The pain does not radiate down the leg.   No fevers, no chills.   History of multiple lithotripsy    \  CONCURRENT MEDICAL HISTORY:    The following portions of the patient's history were reviewed and updated as appropriate: allergies, current medications, past family history, past medical history, past social history, past surgical history and problem list.     ROS  No fevers or chills.  No chest pain or shortness of air.  No GI or  disturbances.    PHYSICAL EXAMINATION:       Ht 188 cm (74\")   Wt 111 kg (244 lb)   BMI 31.33 kg/m²     Physical Exam   Constitutional: He is oriented to person, place, and time. He appears well-developed and well-nourished. No distress.   HENT:   Head: Normocephalic and atraumatic.   Mouth/Throat: No oropharyngeal exudate.   Eyes: Left eye exhibits no discharge. No scleral icterus.   Neck: Neck supple. No JVD present. No tracheal deviation present. No thyromegaly present.   Cardiovascular: Normal rate and intact distal pulses.    No murmur heard.  Pulmonary/Chest: Effort normal. No respiratory distress. He has no wheezes. He has no rales.   Abdominal: Soft. He exhibits no distension. There is no tenderness. There is no guarding.   Neurological: He is alert and oriented to person, place, and time. He displays normal reflexes. No cranial nerve deficit. Coordination normal.   Skin: Skin is warm and dry. Capillary refill takes less than 2 seconds. No rash noted. No erythema. No pallor.   Psychiatric: He has a normal mood and affect.       GAIT:     [x]  Normal  []  Antalgic    Assistive device: [x]  None  []  Walker     []  Crutches  []  " Cane     []  Wheelchair  []  Stretcher    Right Hip Exam     Tenderness   The patient is experiencing tenderness in the anterior.    Range of Motion   Extension: -10   Flexion: 130   Abduction: 25     Muscle Strength   Abduction: 5/5   Adduction: 5/5   Flexion: 5/5     Tests   SAVANNAH: negative  Fadir:  Negative FADIR test    Other   Erythema: absent  Scars: absent  Sensation: normal      Left Hip Exam     Tenderness   The patient is experiencing tenderness in the anterior.    Range of Motion   Extension: -10   Flexion: 130   Abduction: 25     Muscle Strength   Abduction: 5/5   Adduction: 5/5   Flexion: 5/5     Tests   SAVANNAH: negative  Fadir:  Negative FADIR test    Other   Erythema: absent  Scars: absent  Sensation: normal      Back Exam     Tenderness   The patient is experiencing tenderness in the lumbar.    Range of Motion   Extension: 10   Flexion: 50   Lateral Bend Right: 10   Lateral Bend Left: 10   Rotation Right: 10   Rotation Left: 10     Muscle Strength   Right Quadriceps:  5/5   Left Quadriceps:  5/5   Right Hamstrings:  5/5   Left Hamstrings:  5/5     Tests   Straight leg raise right: negative  Straight leg raise left: negative    Reflexes   Patellar: 1/4    Other   Sensation: normal  Gait: normal   Erythema: no back redness  Scars: absent              No results found.    I reviewed MRI of right and left hip on 4/5/2018 , there is avascular necrosis right and left hips. The articular surface of the femoral head is intact.         ASSESSMENT:    Diagnoses and all orders for this visit:    Bilateral hip pain    Avascular necrosis of bone of right hip (CMS/HCC)    Avascular necrosis of bone of left hip (CMS/HCC)    DDD (degenerative disc disease), lumbar  -     MRI Lumbar Spine Without Contrast; Future          PLAN      Recommend MRI of lumbar spine for L5-S1 degenerative disc disease.      Royal Suarez MD

## 2018-11-26 ENCOUNTER — OFFICE VISIT (OUTPATIENT)
Dept: FAMILY MEDICINE CLINIC | Facility: CLINIC | Age: 58
End: 2018-11-26

## 2018-11-26 VITALS
SYSTOLIC BLOOD PRESSURE: 130 MMHG | HEIGHT: 74 IN | OXYGEN SATURATION: 93 % | DIASTOLIC BLOOD PRESSURE: 80 MMHG | WEIGHT: 242 LBS | HEART RATE: 124 BPM | BODY MASS INDEX: 31.06 KG/M2

## 2018-11-26 DIAGNOSIS — E06.3 HASHIMOTO'S THYROIDITIS: ICD-10-CM

## 2018-11-26 DIAGNOSIS — M51.36 DDD (DEGENERATIVE DISC DISEASE), LUMBAR: ICD-10-CM

## 2018-11-26 DIAGNOSIS — M25.551 BILATERAL HIP PAIN: Primary | ICD-10-CM

## 2018-11-26 DIAGNOSIS — M25.552 BILATERAL HIP PAIN: Primary | ICD-10-CM

## 2018-11-26 PROCEDURE — 99213 OFFICE O/P EST LOW 20 MIN: CPT | Performed by: STUDENT IN AN ORGANIZED HEALTH CARE EDUCATION/TRAINING PROGRAM

## 2018-11-26 PROCEDURE — 99406 BEHAV CHNG SMOKING 3-10 MIN: CPT | Performed by: STUDENT IN AN ORGANIZED HEALTH CARE EDUCATION/TRAINING PROGRAM

## 2018-11-26 NOTE — PROGRESS NOTES
I have seen the patient.  I have reviewed the notes, assessments, and/or procedures performed by the resident, I concur with her/his documentation and assessment and plan for Martin Bender.                This document has been electronically signed by Branden Lin MD on November 26, 2018 9:13 AM

## 2019-01-09 DIAGNOSIS — G40.909 SEIZURE DISORDER (HCC): ICD-10-CM

## 2019-01-10 RX ORDER — LEVETIRACETAM 1000 MG/1
1000 TABLET ORAL EVERY 12 HOURS SCHEDULED
Qty: 60 TABLET | Refills: 3 | OUTPATIENT
Start: 2019-01-10

## 2019-01-16 ENCOUNTER — TELEPHONE (OUTPATIENT)
Dept: FAMILY MEDICINE CLINIC | Facility: CLINIC | Age: 59
End: 2019-01-16

## 2019-01-21 DIAGNOSIS — G40.909 SEIZURE DISORDER (HCC): ICD-10-CM

## 2019-01-21 RX ORDER — LEVETIRACETAM 1000 MG/1
1000 TABLET ORAL EVERY 12 HOURS SCHEDULED
Qty: 60 TABLET | Refills: 3 | OUTPATIENT
Start: 2019-01-21

## 2019-01-21 RX ORDER — LEVETIRACETAM 1000 MG/1
1000 TABLET ORAL EVERY 12 HOURS SCHEDULED
Qty: 60 TABLET | Refills: 3 | Status: SHIPPED | OUTPATIENT
Start: 2019-01-21 | End: 2019-02-26 | Stop reason: SDUPTHER

## 2019-02-26 ENCOUNTER — OFFICE VISIT (OUTPATIENT)
Dept: FAMILY MEDICINE CLINIC | Facility: CLINIC | Age: 59
End: 2019-02-26

## 2019-02-26 VITALS
OXYGEN SATURATION: 95 % | DIASTOLIC BLOOD PRESSURE: 82 MMHG | BODY MASS INDEX: 30.83 KG/M2 | HEART RATE: 106 BPM | SYSTOLIC BLOOD PRESSURE: 142 MMHG | HEIGHT: 74 IN | WEIGHT: 240.2 LBS

## 2019-02-26 DIAGNOSIS — G89.29 CHRONIC BILATERAL LOW BACK PAIN WITHOUT SCIATICA: ICD-10-CM

## 2019-02-26 DIAGNOSIS — Z13.1 ENCOUNTER FOR SCREENING EXAMINATION FOR INTERMEDIATE HYPERGLYCEMIA AND DIABETES MELLITUS: ICD-10-CM

## 2019-02-26 DIAGNOSIS — I63.9 CEREBROVASCULAR ACCIDENT (CVA), UNSPECIFIED MECHANISM (HCC): ICD-10-CM

## 2019-02-26 DIAGNOSIS — E06.3 HASHIMOTO'S THYROIDITIS: ICD-10-CM

## 2019-02-26 DIAGNOSIS — M87.051 AVASCULAR NECROSIS OF FEMORAL HEAD, RIGHT (HCC): Primary | ICD-10-CM

## 2019-02-26 DIAGNOSIS — E78.5 DYSLIPIDEMIA: ICD-10-CM

## 2019-02-26 DIAGNOSIS — K21.9 GASTROESOPHAGEAL REFLUX DISEASE, ESOPHAGITIS PRESENCE NOT SPECIFIED: ICD-10-CM

## 2019-02-26 DIAGNOSIS — G40.909 SEIZURE DISORDER (HCC): ICD-10-CM

## 2019-02-26 DIAGNOSIS — M54.50 CHRONIC BILATERAL LOW BACK PAIN WITHOUT SCIATICA: ICD-10-CM

## 2019-02-26 PROCEDURE — 99213 OFFICE O/P EST LOW 20 MIN: CPT | Performed by: STUDENT IN AN ORGANIZED HEALTH CARE EDUCATION/TRAINING PROGRAM

## 2019-02-26 RX ORDER — BACLOFEN 10 MG/1
10 TABLET ORAL 3 TIMES DAILY
Qty: 90 TABLET | Refills: 2 | Status: SHIPPED | OUTPATIENT
Start: 2019-02-26 | End: 2019-05-22 | Stop reason: SDUPTHER

## 2019-02-26 RX ORDER — LEVOTHYROXINE SODIUM 0.03 MG/1
25 TABLET ORAL DAILY
Qty: 30 TABLET | Refills: 2 | Status: SHIPPED | OUTPATIENT
Start: 2019-02-26 | End: 2019-05-22 | Stop reason: SDUPTHER

## 2019-02-26 RX ORDER — ASPIRIN 81 MG/1
81 TABLET, CHEWABLE ORAL DAILY
Qty: 30 TABLET | Refills: 11 | Status: SHIPPED | OUTPATIENT
Start: 2019-02-26 | End: 2019-05-22 | Stop reason: SDUPTHER

## 2019-02-26 RX ORDER — LEVETIRACETAM 1000 MG/1
1000 TABLET ORAL 2 TIMES DAILY
Qty: 60 TABLET | Refills: 2 | Status: SHIPPED | OUTPATIENT
Start: 2019-02-26 | End: 2019-05-22 | Stop reason: SDUPTHER

## 2019-02-26 RX ORDER — RANITIDINE 150 MG/1
150 TABLET ORAL NIGHTLY
Qty: 60 TABLET | Refills: 11 | Status: SHIPPED | OUTPATIENT
Start: 2019-02-26 | End: 2019-05-22 | Stop reason: SDUPTHER

## 2019-02-26 RX ORDER — ATORVASTATIN CALCIUM 40 MG/1
40 TABLET, FILM COATED ORAL DAILY
Qty: 30 TABLET | Refills: 11 | Status: SHIPPED | OUTPATIENT
Start: 2019-02-26 | End: 2019-05-22 | Stop reason: SDUPTHER

## 2019-02-26 NOTE — PROGRESS NOTES
I have seen the patient.  I have reviewed the notes, assessments, and/or procedures performed by Monica Wilkinson MD , I concur with her/his documentation and assessment and plan for Martin Bender.        This document has been electronically signed by Justa Lam MD on February 26, 2019 1:40 PM

## 2019-02-26 NOTE — PROGRESS NOTES
Family Medicine Residency   Monica Wilkinson MD    Martin Bender is a 58 y.o. male who presents to family medicine residency clinic for the following:    PROBLEM LIST:  1. Cerebrovascular Accident  2. GERD  3. Hashimoto's thyroiditis  4. Primary Hyperparathyroidism  5. Seizure Disorder  6. Bilateral Hip Pain with avascular necrosis of right hip  7. Renal Calculi  8. Dyslipidemia  9. Tobacco Dependence    Hip Pain  He is currently taking care of his elderly mother.  This is causing him a lot of pain.  He has a history of avascular necrosis in his right hip.  He has full range of motion however it causes a lot of pain.  He describes it as a burning and ache type of pain.  The weather makes it worse.  It is also worsened by movement.  He has been evaluated previously by orthopedics.  He would like to be re-referred today for additional evaluation.  He is ready to do something about the hip pain.    Hashimoto's   He did not go to the lab after the last visit.  He stated he would go to the lab after today's visit.  We will call him with results.  Will continue with current Synthroid dose until TSH is back.    Seizures  He has history of seizure disorder.  He has not had a seizure for 9 years.  He is currently on Keppra 1000mg twice daily.  Ordered a Keppra level at last visit and he did not go get it done.  Commended going to get that lab today.         Past Medical Hx:   Past Medical History:   Diagnosis Date   • Age related cataract     right   • Artificial lens present     in position   • Dyslipidemia    • Encounter for general adult medical examination without abnormal findings    • Gastroesophageal reflux disease    • Hashimoto's thyroiditis    • Headache    • Kidney stone    • Primary hyperparathyroidism (CMS/HCC)     possible primary normocalcemic but vit D has to be corrected first   • Secondary hyperparathyroidism (CMS/HCC)    • Seizure disorder (CMS/HCC)        Past Surgical Hx:  Past Surgical  "History:   Procedure Laterality Date   • COLONOSCOPY  2015   • CYSTOSCOPY  01/11/2016    Left ureteroscopy with laser lithotripsy.Left retrograde pyelography.Left double J stent insertion, double J stent with no tether. [facility]. Per patient had stone removal \"25 times\"       Current Meds:    Current Outpatient Medications:   •  aspirin 81 MG chewable tablet, Chew 1 tablet Daily., Disp: 30 tablet, Rfl: 11  •  atorvastatin (LIPITOR) 40 MG tablet, Take 1 tablet by mouth Daily., Disp: 30 tablet, Rfl: 11  •  baclofen (LIORESAL) 10 MG tablet, Take 1 tablet by mouth 3 (Three) Times a Day., Disp: 90 tablet, Rfl: 2  •  levETIRAcetam (KEPPRA) 1000 MG tablet, Take 1 tablet by mouth 2 (Two) Times a Day., Disp: 60 tablet, Rfl: 2  •  levothyroxine (SYNTHROID, LEVOTHROID) 25 MCG tablet, Take 1 tablet by mouth Daily., Disp: 30 tablet, Rfl: 2  •  raNITIdine (ZANTAC) 150 MG tablet, Take 1 tablet by mouth Every Night., Disp: 60 tablet, Rfl: 11    Allergies:  Penicillins    Family Hx:  Family History   Problem Relation Age of Onset   • Cancer Other    • Diabetes Other    • Heart disease Other    • Hypertension Other         Social History:  Social History     Socioeconomic History   • Marital status:      Spouse name: Not on file   • Number of children: Not on file   • Years of education: Not on file   • Highest education level: Not on file   Social Needs   • Financial resource strain: Not on file   • Food insecurity - worry: Not on file   • Food insecurity - inability: Not on file   • Transportation needs - medical: Not on file   • Transportation needs - non-medical: Not on file   Occupational History   • Not on file   Tobacco Use   • Smoking status: Current Every Day Smoker     Packs/day: 0.50     Years: 30.00     Pack years: 15.00     Types: Cigarettes   • Smokeless tobacco: Never Used   Substance and Sexual Activity   • Alcohol use: No   • Drug use: No   • Sexual activity: Not on file   Other Topics " Concern   • Not on file   Social History Narrative   • Not on file       Review of Systems  Review of Systems   Constitutional: Negative for chills, diaphoresis and fever.   HENT: Negative for sneezing and sore throat.    Eyes: Negative for pain and discharge.   Respiratory: Negative for cough and shortness of breath.    Gastrointestinal: Negative for constipation, diarrhea, nausea and vomiting.   Endocrine: Negative for cold intolerance and heat intolerance.   Genitourinary: Negative for difficulty urinating, dysuria, frequency and urgency.   Musculoskeletal: Negative for arthralgias and myalgias.        Hip Pain   Skin: Negative for color change and pallor.   Allergic/Immunologic: Negative for environmental allergies and food allergies.   Neurological: Negative for dizziness, syncope and weakness.   Psychiatric/Behavioral: Negative for confusion and sleep disturbance.       Physical Exam:  Vitals:    02/26/19 1317   BP: 142/82   Pulse: 106   SpO2: 95%       Body mass index is 30.84 kg/m².   Physical Exam   Constitutional: He is oriented to person, place, and time. He appears well-developed and well-nourished. No distress.   HENT:   Head: Normocephalic and atraumatic.   Nose: Nose normal.   Eyes: Conjunctivae and EOM are normal.   Neck: Normal range of motion. Neck supple.   Cardiovascular: Normal rate, regular rhythm and normal heart sounds.   No murmur heard.  Pulmonary/Chest: Effort normal and breath sounds normal. No respiratory distress. He has no wheezes. He has no rales.   Abdominal: Soft. Bowel sounds are normal. He exhibits no distension. There is no tenderness. There is no guarding.   Musculoskeletal: Normal range of motion.   Full active and passive ROM. Pain with movement.    Neurological: He is alert and oriented to person, place, and time.   Skin: Skin is warm and dry.   Psychiatric: He has a normal mood and affect. His behavior is normal.   Vitals reviewed.        Data Reviewed:     LABS:   Lab  Results   Component Value Date    GLUCOSE 88 02/21/2018    BUN 12 02/21/2018    CREATININE 0.86 02/21/2018    EGFRIFNONA 92 02/21/2018    BCR 14.0 02/21/2018    K 4.0 02/21/2018    CO2 25.0 02/21/2018    CALCIUM 9.5 02/21/2018    ALBUMIN 4.30 02/21/2018    AST 33 02/21/2018    ALT 49 02/21/2018     Lab Results   Component Value Date    WBC 8.31 07/11/2017    HGB 16.4 07/11/2017    HCT 47.4 07/11/2017    MCV 95.4 07/11/2017     07/11/2017     Lab Results   Component Value Date    CHOL 230 (H) 07/11/2017    TRIG 509 (H) 07/11/2017    HDL 29 (L) 07/11/2017     (H) 07/11/2017     Lab Results   Component Value Date    TSH 1.910 02/21/2018     No results found for: HGBA1C  Lab Results   Component Value Date    CREATININE 0.86 02/21/2018       Assessment/Plan     Hip Pain- avascular necrosis of bone of right hip   -Refer to Dr. Suarez.  He has seen him in the past.    History of Hashimoto's and Primary Hyperparathyroidism  -He did not go get labs after last visit.  Recommended going to the lab after this visit.    Seizures  -Will continue Keppra 1000mg twice daily.  He is not prepared to not be able to drive for 90 days if he did have a seizure.  He currently takes care of his mother.   -We will consider referral to neurology in the future for the possibility of weaning his dose.  He will would rather take care of his hip first.    Weight:  -Class: Obese Class I: 30-34.9kg/m2  -Patient's Body mass index is 30.84 kg/m². BMI is above normal parameters. Recommendations include: nutrition counseling.      Nicotine status:  reports that he has been smoking cigarettes.  He has a 15.00 pack-year smoking history. he has never used smokeless tobacco.   I advised Martin of the risks of continuing to use tobacco, and I provided him with tobacco cessation educational materials in the After Visit Summary.     During this visit, I spent 5 minutes counseling the patient regarding tobacco cessation.    Alcohol use:   reports that he does not drink alcohol.    Health Maintenance:   Health Maintenance   Topic Date Due   • ANNUAL PHYSICAL  10/01/1963   • ZOSTER VACCINE (1 of 2) 02/26/2019 (Originally 10/1/2011)   • COLONOSCOPY  01/01/2025   • TDAP/TD VACCINES (2 - Td) 01/28/2026   • PNEUMOCOCCAL VACCINE (19-64 MEDIUM RISK)  Completed   • HEPATITIS C SCREENING  Completed   • INFLUENZA VACCINE  Addressed       FOLLOW-UP  Return in about 3 months (around 5/26/2019) for Recheck.      ORDERS  Medications Discontinued During This Encounter   Medication Reason   • atorvastatin (LIPITOR) 40 MG tablet Reorder   • baclofen (LIORESAL) 10 MG tablet Reorder   • levETIRAcetam (KEPPRA) 1000 MG tablet Reorder   • levothyroxine (SYNTHROID, LEVOTHROID) 25 MCG tablet Reorder   • raNITIdine (ZANTAC) 150 MG tablet Reorder   • aspirin 81 MG chewable tablet Reorder     Martin was seen today for hip pain.    Diagnoses and all orders for this visit:    Avascular necrosis of femoral head, right (CMS/HCC)  -     Ambulatory Referral to Orthopedic Surgery    Dyslipidemia  -     atorvastatin (LIPITOR) 40 MG tablet; Take 1 tablet by mouth Daily.    Chronic bilateral low back pain without sciatica  -     baclofen (LIORESAL) 10 MG tablet; Take 1 tablet by mouth 3 (Three) Times a Day.    Seizure disorder (CMS/HCC)  -     levETIRAcetam (KEPPRA) 1000 MG tablet; Take 1 tablet by mouth 2 (Two) Times a Day.    Hashimoto's thyroiditis  -     levothyroxine (SYNTHROID, LEVOTHROID) 25 MCG tablet; Take 1 tablet by mouth Daily.    Gastroesophageal reflux disease, esophagitis presence not specified  -     raNITIdine (ZANTAC) 150 MG tablet; Take 1 tablet by mouth Every Night.    Cerebrovascular accident (CVA), unspecified mechanism (CMS/HCC)  -     aspirin 81 MG chewable tablet; Chew 1 tablet Daily.    Encounter for screening examination for intermediate hyperglycemia and diabetes mellitus  -     Hemoglobin A1c; Future      Goals     • Less pain      Barriers:  none           Risk Score: 3        This document has been electronically signed by Monica Wilkinson MD on February 26, 2019 1:52 PM

## 2019-03-18 DIAGNOSIS — M25.551 RIGHT HIP PAIN: Primary | ICD-10-CM

## 2019-03-20 ENCOUNTER — OFFICE VISIT (OUTPATIENT)
Dept: ORTHOPEDIC SURGERY | Facility: CLINIC | Age: 59
End: 2019-03-20

## 2019-03-20 VITALS — HEIGHT: 74 IN | WEIGHT: 240 LBS | BODY MASS INDEX: 30.8 KG/M2

## 2019-03-20 DIAGNOSIS — M51.36 DDD (DEGENERATIVE DISC DISEASE), LUMBAR: ICD-10-CM

## 2019-03-20 DIAGNOSIS — M87.052 AVASCULAR NECROSIS OF BONE OF LEFT HIP (HCC): ICD-10-CM

## 2019-03-20 DIAGNOSIS — M25.551 RIGHT HIP PAIN: Primary | ICD-10-CM

## 2019-03-20 PROCEDURE — 99213 OFFICE O/P EST LOW 20 MIN: CPT | Performed by: ORTHOPAEDIC SURGERY

## 2019-03-20 NOTE — PROGRESS NOTES
"Martin Bender is a 58 y.o. male returns for     Chief Complaint   Patient presents with   • Right Hip - Pain       HISTORY OF PRESENT ILLNESS: Patient is here today for right hip pain. Patient was sent to xray upon arrival. Patient states that his right hip pain is 9/10 today.      58 y returns with increasing pain of the right side, in the pelvic/ hip region. The pain is a dull pain, the pain occurs daily.  The pain does not radiate.  No nighttime awakening pain.  No numbness no weakness, no fevers, no chills.  Pain is improved with rest.    CONCURRENT MEDICAL HISTORY:    The following portions of the patient's history were reviewed and updated as appropriate: allergies, current medications, past family history, past medical history, past social history, past surgical history and problem list.     ROS  No fevers or chills.  No chest pain or shortness of air.  No GI or  disturbances.    PHYSICAL EXAMINATION:       Ht 188 cm (74\")   Wt 109 kg (240 lb)   BMI 30.81 kg/m²     Physical Exam    GAIT:     []  Normal  []  Antalgic    Assistive device: []  None  []  Walker     []  Crutches  []  Cane     []  Wheelchair  []  Stretcher    Ortho Exam  Ambulating normally.  He limps somewhat with legs externally rotated  No internal rotation of the right hip  10 degrees internal rotation of the left hip.  Lumbar alignment is normal.         Xr Hip With Or Without Pelvis 2 - 3 View Right    Result Date: 3/20/2019  Narrative: Ordering Provider:  Royal Suarez MD Ordering Diagnosis/Indication:  Right hip pain Procedure:  XR HIP W OR WO PELVIS 2-3 VIEW RIGHT Exam Date:  3/20/19 RELEVANT PRIOR IMAGES:  XR Hips Bilateral With or Without Pelvis 5 View 07/16/2018 [id number] Final COMPARISON:  Todays X-rays were compared to previous images dated 7/16/2018.     Impression:  hip joint alignment is good, femoral head sphericity is good, slight decrease joint space of the hip joint, sclerotic margin of the femoral head, " with crescent sign, of the right hip. Bone quality: good Alignment:  Hip joint alignment is good Fracture: none Soft tissue: normal soft tissues.              ASSESSMENT:    Diagnoses and all orders for this visit:    Right hip pain    DDD (degenerative disc disease), lumbar  -     MRI Lumbar Spine Without Contrast; Future    Avascular necrosis of bone of left hip (CMS/HCC)          PLAN    Need evaluation of MRI lumbar spine.   Stable avascular of the hip both right and left.         Royal Suarez MD

## 2019-03-29 ENCOUNTER — APPOINTMENT (OUTPATIENT)
Dept: MRI IMAGING | Facility: HOSPITAL | Age: 59
End: 2019-03-29

## 2019-04-03 ENCOUNTER — HOSPITAL ENCOUNTER (OUTPATIENT)
Dept: MRI IMAGING | Facility: HOSPITAL | Age: 59
Discharge: HOME | End: 2019-04-03
Admitting: ORTHOPAEDIC SURGERY

## 2019-04-03 DIAGNOSIS — M51.36 DDD (DEGENERATIVE DISC DISEASE), LUMBAR: ICD-10-CM

## 2019-04-03 PROCEDURE — 72148 MRI LUMBAR SPINE W/O DYE: CPT

## 2019-05-22 ENCOUNTER — OFFICE VISIT (OUTPATIENT)
Dept: FAMILY MEDICINE CLINIC | Facility: CLINIC | Age: 59
End: 2019-05-22

## 2019-05-22 VITALS
OXYGEN SATURATION: 98 % | HEIGHT: 74 IN | BODY MASS INDEX: 29.61 KG/M2 | HEART RATE: 100 BPM | WEIGHT: 230.7 LBS | DIASTOLIC BLOOD PRESSURE: 82 MMHG | SYSTOLIC BLOOD PRESSURE: 146 MMHG

## 2019-05-22 DIAGNOSIS — G40.909 SEIZURE DISORDER (HCC): ICD-10-CM

## 2019-05-22 DIAGNOSIS — I63.9 CEREBROVASCULAR ACCIDENT (CVA), UNSPECIFIED MECHANISM (HCC): ICD-10-CM

## 2019-05-22 DIAGNOSIS — M25.551 RIGHT HIP PAIN: Primary | ICD-10-CM

## 2019-05-22 DIAGNOSIS — M54.50 CHRONIC BILATERAL LOW BACK PAIN WITHOUT SCIATICA: ICD-10-CM

## 2019-05-22 DIAGNOSIS — K21.9 GASTROESOPHAGEAL REFLUX DISEASE, ESOPHAGITIS PRESENCE NOT SPECIFIED: ICD-10-CM

## 2019-05-22 DIAGNOSIS — G89.29 CHRONIC BILATERAL LOW BACK PAIN WITHOUT SCIATICA: ICD-10-CM

## 2019-05-22 DIAGNOSIS — E06.3 HASHIMOTO'S THYROIDITIS: ICD-10-CM

## 2019-05-22 DIAGNOSIS — E78.5 DYSLIPIDEMIA: ICD-10-CM

## 2019-05-22 PROCEDURE — 99213 OFFICE O/P EST LOW 20 MIN: CPT | Performed by: STUDENT IN AN ORGANIZED HEALTH CARE EDUCATION/TRAINING PROGRAM

## 2019-05-22 RX ORDER — LEVETIRACETAM 1000 MG/1
1000 TABLET ORAL 2 TIMES DAILY
Qty: 60 TABLET | Refills: 2 | Status: SHIPPED | OUTPATIENT
Start: 2019-05-22 | End: 2019-11-19 | Stop reason: SDUPTHER

## 2019-05-22 RX ORDER — RANITIDINE 150 MG/1
150 TABLET ORAL NIGHTLY
Qty: 60 TABLET | Refills: 11 | Status: SHIPPED | OUTPATIENT
Start: 2019-05-22 | End: 2019-11-19 | Stop reason: SDUPTHER

## 2019-05-22 RX ORDER — ASPIRIN 81 MG/1
81 TABLET, CHEWABLE ORAL DAILY
Qty: 30 TABLET | Refills: 11 | Status: SHIPPED | OUTPATIENT
Start: 2019-05-22 | End: 2021-01-26 | Stop reason: SDUPTHER

## 2019-05-22 RX ORDER — ATORVASTATIN CALCIUM 40 MG/1
40 TABLET, FILM COATED ORAL DAILY
Qty: 30 TABLET | Refills: 11 | Status: SHIPPED | OUTPATIENT
Start: 2019-05-22 | End: 2019-11-19 | Stop reason: SDUPTHER

## 2019-05-22 RX ORDER — BACLOFEN 10 MG/1
10 TABLET ORAL 3 TIMES DAILY
Qty: 90 TABLET | Refills: 2 | Status: SHIPPED | OUTPATIENT
Start: 2019-05-22 | End: 2019-11-01 | Stop reason: SDUPTHER

## 2019-05-22 RX ORDER — LEVOTHYROXINE SODIUM 0.03 MG/1
25 TABLET ORAL DAILY
Qty: 30 TABLET | Refills: 2 | Status: SHIPPED | OUTPATIENT
Start: 2019-05-22 | End: 2019-11-19 | Stop reason: SDUPTHER

## 2019-06-03 NOTE — PROGRESS NOTES
I have seen the patient.  I have reviewed the notes, assessments, and/or procedures performed by Monica Wilkinson MD, I concur with her/his documentation and assessment and plan for Martin Bender.               This document has been electronically signed by Gerson Lynch MD on Winnie 3, 2019 10:56 AM

## 2019-08-30 ENCOUNTER — LAB (OUTPATIENT)
Dept: LAB | Facility: HOSPITAL | Age: 59
End: 2019-08-30

## 2019-08-30 ENCOUNTER — OFFICE VISIT (OUTPATIENT)
Dept: FAMILY MEDICINE CLINIC | Facility: CLINIC | Age: 59
End: 2019-08-30

## 2019-08-30 VITALS
HEIGHT: 74 IN | SYSTOLIC BLOOD PRESSURE: 130 MMHG | BODY MASS INDEX: 28.62 KG/M2 | OXYGEN SATURATION: 98 % | WEIGHT: 223 LBS | DIASTOLIC BLOOD PRESSURE: 80 MMHG | HEART RATE: 102 BPM

## 2019-08-30 DIAGNOSIS — E06.3 HASHIMOTO'S THYROIDITIS: ICD-10-CM

## 2019-08-30 DIAGNOSIS — E78.5 DYSLIPIDEMIA: ICD-10-CM

## 2019-08-30 DIAGNOSIS — E21.3 HYPERPARATHYROIDISM (HCC): ICD-10-CM

## 2019-08-30 DIAGNOSIS — M25.551 RIGHT HIP PAIN: Primary | ICD-10-CM

## 2019-08-30 DIAGNOSIS — Z13.1 ENCOUNTER FOR SCREENING EXAMINATION FOR INTERMEDIATE HYPERGLYCEMIA AND DIABETES MELLITUS: ICD-10-CM

## 2019-08-30 LAB — HBA1C MFR BLD: 5.58 % (ref 4.8–5.6)

## 2019-08-30 PROCEDURE — 36415 COLL VENOUS BLD VENIPUNCTURE: CPT

## 2019-08-30 PROCEDURE — 99213 OFFICE O/P EST LOW 20 MIN: CPT | Performed by: STUDENT IN AN ORGANIZED HEALTH CARE EDUCATION/TRAINING PROGRAM

## 2019-08-30 PROCEDURE — 83036 HEMOGLOBIN GLYCOSYLATED A1C: CPT

## 2019-09-03 ENCOUNTER — TELEPHONE (OUTPATIENT)
Dept: FAMILY MEDICINE CLINIC | Facility: CLINIC | Age: 59
End: 2019-09-03

## 2019-09-03 NOTE — TELEPHONE ENCOUNTER
No answer. Left patient a message letting him know they didn't draw all his labs and if he could stop by the lab sometime and get those done

## 2019-09-04 NOTE — PROGRESS NOTES
I have seen the patient.  I have reviewed the notes, assessments, and/or procedures performed by Monica Wilkinson MD, I concur with her/his documentation and assessment and plan for Martin Bender.               This document has been electronically signed by Gerson Lynch MD on September 4, 2019 11:44 AM

## 2019-09-17 ENCOUNTER — OFFICE VISIT (OUTPATIENT)
Dept: ORTHOPEDIC SURGERY | Facility: CLINIC | Age: 59
End: 2019-09-17

## 2019-09-17 VITALS — HEIGHT: 74 IN | WEIGHT: 230 LBS | BODY MASS INDEX: 29.52 KG/M2

## 2019-09-17 DIAGNOSIS — M70.61 TROCHANTERIC BURSITIS OF RIGHT HIP: Primary | ICD-10-CM

## 2019-09-17 DIAGNOSIS — M70.71 BURSITIS OF RIGHT HIP, UNSPECIFIED BURSA: ICD-10-CM

## 2019-09-17 DIAGNOSIS — M25.551 RIGHT HIP PAIN: ICD-10-CM

## 2019-09-17 PROCEDURE — 20610 DRAIN/INJ JOINT/BURSA W/O US: CPT | Performed by: ORTHOPAEDIC SURGERY

## 2019-09-17 PROCEDURE — 99213 OFFICE O/P EST LOW 20 MIN: CPT | Performed by: ORTHOPAEDIC SURGERY

## 2019-09-17 RX ADMIN — TRIAMCINOLONE ACETONIDE 40 MG: 40 INJECTION, SUSPENSION INTRA-ARTICULAR; INTRAMUSCULAR at 14:13

## 2019-09-17 RX ADMIN — LIDOCAINE HYDROCHLORIDE 2 ML: 10 INJECTION, SOLUTION EPIDURAL; INFILTRATION; INTRACAUDAL; PERINEURAL at 14:13

## 2019-09-17 NOTE — PROGRESS NOTES
"Martin Bender is a 58 y.o. male returns for     Chief Complaint   Patient presents with   • Right Hip - Follow-up       HISTORY OF PRESENT ILLNESS:  F/u right hip pain, patient states pain score is at a 5 today,   He is having pain laterally.  He has no reports of groin pain.  No numbness or tingling.  Pain worse with activity.       CONCURRENT MEDICAL HISTORY:    The following portions of the patient's history were reviewed and updated as appropriate: allergies, current medications, past family history, past medical history, past social history, past surgical history and problem list.     ROS  No fevers or chills.  No chest pain or shortness of air.  No GI or  disturbances.    PHYSICAL EXAMINATION:       Ht 188 cm (74\")   Wt 104 kg (230 lb)   BMI 29.53 kg/m²     Physical Exam   Constitutional: He is oriented to person, place, and time. He appears well-developed and well-nourished.   Neurological: He is alert and oriented to person, place, and time.   Psychiatric: He has a normal mood and affect. His behavior is normal. Judgment and thought content normal.       GAIT:     []  Normal  []  Antalgic    Assistive device: []  None  []  Walker     []  Crutches  []  Cane     []  Wheelchair  []  Stretcher    Right Hip Exam     Tenderness   The patient is experiencing tenderness in the greater trochanter.    Range of Motion   External rotation: 40   Internal rotation: 30     Muscle Strength   Flexion: 4/5     Tests   SAVANNAH: negative  Sharmin: positive  Fadir:  Negative FADIR test    Other   Erythema: absent  Sensation: normal  Pulse: present                        ASSESSMENT:    Diagnoses and all orders for this visit:    Trochanteric bursitis of right hip  -     Large Joint Arthrocentesis: R greater trochanteric bursa  -     Ambulatory Referral to Physical Therapy Evaluate and treat; ROM, Strengthening    Right hip pain    Bursitis of right hip, unspecified bursa  -     Large Joint Arthrocentesis: R greater " trochanteric bursa  -     Ambulatory Referral to Physical Therapy Evaluate and treat; ROM, Strengthening          PLAN    We discussed a trial of steroid injection into the trochanteric bursa of the right hip.  He will begin physical therapy for strengthening and conditioning exercises.  We also discussed home exercise program.  He does have very mild arthritic change but I think his symptoms are primarily coming from greater trochanter.  Activity as tolerated.  Follow-up as needed.      Large Joint Arthrocentesis: R greater trochanteric bursa  Date/Time: 9/17/2019 2:13 PM  Consent given by: patient  Site marked: site marked  Timeout: Immediately prior to procedure a time out was called to verify the correct patient, procedure, equipment, support staff and site/side marked as required   Supporting Documentation  Indications: pain   Procedure Details  Location: hip - R greater trochanteric bursa  Preparation: Patient was prepped and draped in the usual sterile fashion  Needle size: 22 G  Medications administered: 40 mg triamcinolone acetonide 40 MG/ML; 2 mL lidocaine PF 1% 1 %            Patient's Body mass index is 29.53 kg/m². BMI is within normal parameters. No follow-up required..      Return if symptoms worsen or fail to improve, for recheck.    Giovanni Rey MD

## 2019-09-20 RX ORDER — TRIAMCINOLONE ACETONIDE 40 MG/ML
40 INJECTION, SUSPENSION INTRA-ARTICULAR; INTRAMUSCULAR
Status: COMPLETED | OUTPATIENT
Start: 2019-09-17 | End: 2019-09-17

## 2019-09-20 RX ORDER — LIDOCAINE HYDROCHLORIDE 10 MG/ML
2 INJECTION, SOLUTION EPIDURAL; INFILTRATION; INTRACAUDAL; PERINEURAL
Status: COMPLETED | OUTPATIENT
Start: 2019-09-17 | End: 2019-09-17

## 2019-10-03 ENCOUNTER — APPOINTMENT (OUTPATIENT)
Dept: PHYSICAL THERAPY | Facility: HOSPITAL | Age: 59
End: 2019-10-03

## 2019-10-14 ENCOUNTER — HOSPITAL ENCOUNTER (OUTPATIENT)
Dept: PHYSICAL THERAPY | Facility: HOSPITAL | Age: 59
Setting detail: THERAPIES SERIES
Discharge: HOME | End: 2019-10-14

## 2019-10-14 DIAGNOSIS — M70.61 TROCHANTERIC BURSITIS OF RIGHT HIP: Primary | ICD-10-CM

## 2019-10-14 DIAGNOSIS — M70.71 BURSITIS OF RIGHT HIP, UNSPECIFIED BURSA: ICD-10-CM

## 2019-10-14 PROCEDURE — 97161 PT EVAL LOW COMPLEX 20 MIN: CPT | Performed by: PHYSICAL THERAPIST

## 2019-10-14 NOTE — THERAPY DISCHARGE NOTE
"     Outpatient Physical Therapy Ortho Initial Evaluation/Discharge  [facility]     Patient Name: Martin Bender  : 1960  MRN: [id number]  Today's Date: 10/14/2019      Visit Date: 10/14/2019  Allergies       PenicillinsRash     Regulo as Reviewed Reviewed by You at 2:36 PM CDT     Medications (Admitted on 10/14/2019)     aspirin 81 MG chewable tablet     atorvastatin (LIPITOR) 40 MG tablet     baclofen (LIORESAL) 10 MG tablet     levETIRAcetam (KEPPRA) 1000 MG tablet     levothyroxine (SYNTHROID, LEVOTHROID) 25 MCG tablet     raNITIdine (ZANTAC) 150 MG tablet            Patient Active Problem List   Diagnosis   • Renal calculi   • Seizure disorder (CMS/HCC)   • Cerebrovascular accident (CMS/HCC)   • Gastroesophageal reflux disease   • Dyslipidemia   • Hashimoto's thyroiditis   • Primary hyperparathyroidism (CMS/HCC)   • Tobacco dependence   • Positive hepatitis C antibody test   • Bilateral hip pain   • DDD (degenerative disc disease), lumbar   • Avascular necrosis of bone of left hip (CMS/HCC)   • Right hip pain        Past Medical History:   Diagnosis Date   • Age related cataract     right   • Artificial lens present     in position   • Dyslipidemia    • Encounter for general adult medical examination without abnormal findings    • Gastroesophageal reflux disease    • Hashimoto's thyroiditis    • Headache    • Kidney stone    • Primary hyperparathyroidism (CMS/HCC)     possible primary normocalcemic but vit D has to be corrected first   • Secondary hyperparathyroidism (CMS/HCC)    • Seizure disorder (CMS/HCC)         Past Surgical History:   Procedure Laterality Date   • COLONOSCOPY     • CYSTOSCOPY  2016    Left ureteroscopy with laser lithotripsy.Left retrograde pyelography.Left double J stent insertion, double J stent with no tether. [facility]. Per patient had stone removal \"25 times\"         Visit Dx:     ICD-10-CM ICD-9-CM   1. Trochanteric bursitis of right hip " M70.61 726.5   2. Bursitis of right hip, unspecified bursa M70.71 726.5       Patient History     Row Name 10/14/19 1400             History    Chief Complaint  Pain  -SW      Type of Pain  Hip pain  -SW      Brief Description of Current Complaint  Patient reports he began having right intermittent pain for a year or two. He had a steroid shot and reports minimal to no pain since that time. He occasionally has mild stiffness in right hip in the mornings.  -SW      Previous treatment for THIS PROBLEM  Injections  -SW      Patient/Caregiver Goals  -- Pt doesn't think he needs therapy/pain has not slowed him do  -SW      Occupation/sports/leisure activities  takes care of his mom and neighbor and 9 dogs  -SW      How has patient tried to help current problem?  maintain an active lifestyle  -SW         Pain     Pain at Present  1  -SW        User Key  (r) = Recorded By, (t) = Taken By, (c) = Cosigned By    Initials Name Provider Type    SW Alice Wilson Physical Therapist          PT Ortho     Row Name 10/14/19 1400       Subjective Comments    Subjective Comments  Patient reports he began having right intermittent pain for a year or two. He had a steroid shot and reports minimal to no pain since that time. He occasionally has mild stiffness in right hip in the mornings. He had to stop putting up fence this morning to come to rehab. He has stayed active and does not feel he needs therapy.   -SW       Subjective Pain    Able to rate subjective pain?  yes  -SW    Pre-Treatment Pain Level  1  -SW    Post-Treatment Pain Level  1  -SW       Right Lower Ext    Rt Hip ABduction PROM  45  -SW    Rt Hip Extension AROM  10  -SW    Rt Hip Flexion PROM  100  -SW    Rt Hip External Rotation PROM  45  -SW    Rt Hip Internal Rotation PROM  10  -SW       MMT Right Lower Ext    Rt Hip Flexion MMT, Gross Movement  (5/5) normal  -SW    Rt Hip Extension MMT, Gross Movement  (5/5) normal  -SW    Rt Hip ABduction MMT, Gross Movement  (5/5)  normal  -SW    Rt Knee Extension MMT, Gross Movement  (5/5) normal  -SW    Rt Knee Flexion MMT, Gross Movement  (5/5) normal  -SW      User Key  (r) = Recorded By, (t) = Taken By, (c) = Cosigned By    Initials Name Provider Type    Alice Singh Physical Therapist                       Therapy Education  Given: Symptoms/condition management  Program: Reinforced  How Provided: Verbal  Provided to: Patient  Level of Understanding: Verbalized    PT OP Goals     Row Name 10/14/19 1500          PT Short Term Goals    STG 1  No goals as patient will not be continueing therapy.  -SW        Time Calculation    PT Goal Re-Cert Due Date  11/04/19  -SW       User Key  (r) = Recorded By, (t) = Taken By, (c) = Cosigned By    Initials Name Provider Type    Alice Singh Physical Therapist          PT Assessment/Plan     Row Name 10/14/19 1500          PT Assessment    Functional Limitations  Limitations in functional capacity and performance  -SW     Impairments  Pain;Joint mobility;Impaired muscle length  -     Assessment Comments  Patient exhibits 5/5 muscle strength throughout right hip, moderate limitation in right hip IR ROM, and mild limitation in right hip extension ROM. He reports improved pain levels since injection and maitenance and premorbid acitivity level. He does not wish to continue therapy at this time, but he wanted to make sure he attended this appointment.as he was sent here by physician and wanted his hip assessed.  -SW     Rehab Potential  Excellent  -     Patient/caregiver participated in establishment of treatment plan and goals  Yes  -SW     Patient would benefit from skilled therapy intervention  Yes  -SW        PT Plan    PT Frequency  One time visit  -SW     Planned CPT's?  PT EVAL LOW COMPLEXITY: 37434  -     PT Plan Comments  Patient was advised to continue to maintain activity level as long as activities did not significantly increase pain in order to maintain strength and flexibility at  right hip.   -       User Key  (r) = Recorded By, (t) = Taken By, (c) = Cosigned By    Initials Name Provider Type    Alice Singh Physical Therapist          OP Exercises     Row Name 10/14/19 1400             Subjective Comments    Subjective Comments  Patient reports he began having right intermittent pain for a year or two. He had a steroid shot and reports minimal to no pain since that time. He occasionally has mild stiffness in right hip in the mornings. He had to stop putting up fence this morning to come to rehab. He has stayed active and does not feel he needs therapy.   -         Subjective Pain    Able to rate subjective pain?  yes  -      Pre-Treatment Pain Level  1  -      Post-Treatment Pain Level  1  -        User Key  (r) = Recorded By, (t) = Taken By, (c) = Cosigned By    Initials Name Provider Type    Alice Singh Physical Therapist                       Outcome Measure Options: Lower Extremity Functional Scale (LEFS)  Lower Extremity Functional Index  Any of your usual work, housework or school activities: A little bit of difficulty  Your usual hobbies, recreational or sporting activities: A little bit of difficulty  Getting into or out of the bath: No difficulty  Walking between rooms: No difficulty  Putting on your shoes or socks: No difficulty  Squatting: A little bit of difficulty  Lifting an object, like a bag of groceries from the floor: No difficulty  Performing light activities around your home: No difficulty  Performing heavy activities around your home: Moderate difficulty  Getting into or out of a car: No difficulty  Walking 2 blocks: A little bit of difficulty  Walking a mile: Moderate difficulty  Going up or down 10 stairs (about 1 flight of stairs): No difficulty  Standing for 1 hour: A little bit of difficulty  Sitting for 1 hour: A little bit of difficulty  Running on even ground: No difficulty  Running on uneven ground: No difficulty  Making sharp turns while  running fast: No difficulty  Hopping: No difficulty  Rolling over in bed: No difficulty  Total: 70      Time Calculation:   Start Time: 1430  Stop Time: 1500  Time Calculation (min): 30 min  Therapy Charges for Today     Code Description Service Date Service Provider Modifiers Qty    10663356030 HC PT EVAL LOW COMPLEXITY 1 10/14/2019 Alice Wilson GP 1          PT G-Codes  Outcome Measure Options: Lower Extremity Functional Scale (LEFS)  Total: 70     OP PT Discharge Summary  Date of Discharge: 10/14/19  Reason for Discharge: Independent, Patient/Caregiver request  Outcomes Achieved: Discharge from facility occurred on same date as evluation  Discharge Destination: Home without follow-up        Alice Wilson  10/14/2019

## 2019-11-01 DIAGNOSIS — G89.29 CHRONIC BILATERAL LOW BACK PAIN WITHOUT SCIATICA: ICD-10-CM

## 2019-11-01 DIAGNOSIS — M54.50 CHRONIC BILATERAL LOW BACK PAIN WITHOUT SCIATICA: ICD-10-CM

## 2019-11-01 RX ORDER — BACLOFEN 10 MG/1
10 TABLET ORAL 3 TIMES DAILY
Qty: 90 TABLET | Refills: 2 | Status: SHIPPED | OUTPATIENT
Start: 2019-11-01 | End: 2019-11-19 | Stop reason: SDUPTHER

## 2019-11-15 ENCOUNTER — TELEPHONE (OUTPATIENT)
Dept: FAMILY MEDICINE CLINIC | Facility: CLINIC | Age: 59
End: 2019-11-15

## 2019-11-19 ENCOUNTER — OFFICE VISIT (OUTPATIENT)
Dept: FAMILY MEDICINE CLINIC | Facility: CLINIC | Age: 59
End: 2019-11-19

## 2019-11-19 VITALS
HEART RATE: 99 BPM | BODY MASS INDEX: 29.39 KG/M2 | OXYGEN SATURATION: 98 % | TEMPERATURE: 97.5 F | SYSTOLIC BLOOD PRESSURE: 130 MMHG | DIASTOLIC BLOOD PRESSURE: 90 MMHG | HEIGHT: 74 IN | WEIGHT: 229 LBS

## 2019-11-19 DIAGNOSIS — G89.29 CHRONIC BILATERAL LOW BACK PAIN WITHOUT SCIATICA: ICD-10-CM

## 2019-11-19 DIAGNOSIS — G40.909 SEIZURE DISORDER (HCC): ICD-10-CM

## 2019-11-19 DIAGNOSIS — E78.5 DYSLIPIDEMIA: ICD-10-CM

## 2019-11-19 DIAGNOSIS — E06.3 HASHIMOTO'S THYROIDITIS: ICD-10-CM

## 2019-11-19 DIAGNOSIS — K21.9 GASTROESOPHAGEAL REFLUX DISEASE, ESOPHAGITIS PRESENCE NOT SPECIFIED: ICD-10-CM

## 2019-11-19 DIAGNOSIS — M54.50 CHRONIC BILATERAL LOW BACK PAIN WITHOUT SCIATICA: ICD-10-CM

## 2019-11-19 PROCEDURE — 99213 OFFICE O/P EST LOW 20 MIN: CPT | Performed by: STUDENT IN AN ORGANIZED HEALTH CARE EDUCATION/TRAINING PROGRAM

## 2019-11-19 RX ORDER — BACLOFEN 10 MG/1
10 TABLET ORAL 3 TIMES DAILY
Qty: 90 TABLET | Refills: 2 | Status: SHIPPED | OUTPATIENT
Start: 2019-11-19 | End: 2020-02-10 | Stop reason: SDUPTHER

## 2019-11-19 RX ORDER — ATORVASTATIN CALCIUM 40 MG/1
40 TABLET, FILM COATED ORAL DAILY
Qty: 30 TABLET | Refills: 11 | Status: SHIPPED | OUTPATIENT
Start: 2019-11-19 | End: 2021-01-26 | Stop reason: SDUPTHER

## 2019-11-19 RX ORDER — LEVOTHYROXINE SODIUM 0.03 MG/1
25 TABLET ORAL DAILY
Qty: 30 TABLET | Refills: 2 | Status: SHIPPED | OUTPATIENT
Start: 2019-11-19 | End: 2020-03-09 | Stop reason: SDUPTHER

## 2019-11-19 RX ORDER — LEVETIRACETAM 1000 MG/1
1000 TABLET ORAL 2 TIMES DAILY
Qty: 60 TABLET | Refills: 12 | Status: SHIPPED | OUTPATIENT
Start: 2019-11-19 | End: 2021-01-22

## 2019-11-19 RX ORDER — RANITIDINE 150 MG/1
150 TABLET ORAL NIGHTLY
Qty: 60 TABLET | Refills: 11 | Status: SHIPPED | OUTPATIENT
Start: 2019-11-19 | End: 2021-02-24 | Stop reason: SDUPTHER

## 2019-12-12 NOTE — PROGRESS NOTES
"     Family Medicine Residency   Monica Wilkinson MD    Martin Bender is a 59 y.o. male who presents to family medicine residency clinic for the following:    Right Hip Pain   He has seen Dr. Suarez in the past for this issue. He saw Dr. Rey who did an injection and recommended PT and home exercises.     Smoking  He is smoking 0.5-1 pack/day. He is not interested in quitting at this time.     Seizure Disorder  He is stable on current medication. Last Seizure was over ten years ago. He needs a refill on Keppra.     Hypothyroidism  He has not gotten lab work done that was ordered at the prior visits. He needs a refill on synthroid. I would like to check TSH to monitor for thyroid function.     Hyperlipidemia  Needs refill of Lipitor today.         Past Medical Hx:   Past Medical History:   Diagnosis Date   • Age related cataract     right   • Artificial lens present     in position   • Dyslipidemia    • Encounter for general adult medical examination without abnormal findings    • Gastroesophageal reflux disease    • Hashimoto's thyroiditis    • Headache    • Kidney stone    • Primary hyperparathyroidism (CMS/HCC)     possible primary normocalcemic but vit D has to be corrected first   • Secondary hyperparathyroidism (CMS/HCC)    • Seizure disorder (CMS/HCC)        Past Surgical Hx:  Past Surgical History:   Procedure Laterality Date   • COLONOSCOPY  2015   • CYSTOSCOPY  01/11/2016    Left ureteroscopy with laser lithotripsy.Left retrograde pyelography.Left double J stent insertion, double J stent with no tether. [facility]. Per patient had stone removal \"25 times\"       Current Meds:    Current Outpatient Medications:   •  aspirin 81 MG chewable tablet, Chew 1 tablet Daily., Disp: 30 tablet, Rfl: 11  •  atorvastatin (LIPITOR) 40 MG tablet, Take 1 tablet by mouth Daily., Disp: 30 tablet, Rfl: 11  •  baclofen (LIORESAL) 10 MG tablet, Take 1 tablet by mouth 3 (Three) Times a Day., Disp: " 90 tablet, Rfl: 2  •  levETIRAcetam (KEPPRA) 1000 MG tablet, Take 1 tablet by mouth 2 (Two) Times a Day., Disp: 60 tablet, Rfl: 12  •  levothyroxine (SYNTHROID, LEVOTHROID) 25 MCG tablet, Take 1 tablet by mouth Daily., Disp: 30 tablet, Rfl: 2  •  raNITIdine (ZANTAC) 150 MG tablet, Take 1 tablet by mouth Every Night., Disp: 60 tablet, Rfl: 11    Allergies:  Penicillins    Family Hx:  Family History   Problem Relation Age of Onset   • Cancer Other    • Diabetes Other    • Heart disease Other    • Hypertension Other         Social History:  Social History     Socioeconomic History   • Marital status: Single     Spouse name: Not on file   • Number of children: Not on file   • Years of education: Not on file   • Highest education level: Not on file   Tobacco Use   • Smoking status: Current Every Day Smoker     Packs/day: 0.50     Years: 30.00     Pack years: 15.00     Types: Cigarettes   • Smokeless tobacco: Never Used   Substance and Sexual Activity   • Alcohol use: No   • Drug use: No       Review of Systems  Review of Systems   Constitutional: Negative for chills, diaphoresis and fever.   HENT: Negative for sneezing and sore throat.    Eyes: Negative for pain and discharge.   Respiratory: Negative for cough and shortness of breath.    Gastrointestinal: Negative for constipation, diarrhea, nausea and vomiting.   Endocrine: Negative for cold intolerance and heat intolerance.   Genitourinary: Negative for difficulty urinating, dysuria, frequency and urgency.   Musculoskeletal: Positive for arthralgias, back pain and gait problem. Negative for myalgias.   Skin: Negative for color change and pallor.   Allergic/Immunologic: Negative for environmental allergies and food allergies.   Neurological: Negative for dizziness, syncope and weakness.   Psychiatric/Behavioral: Negative for confusion and sleep disturbance.       Physical Exam:  Vitals:    11/19/19 1451   BP: 130/90   Pulse: 99   Temp: 97.5 °F (36.4 °C)   SpO2: 98%        Body mass index is 29.4 kg/m².   Physical Exam   Constitutional: He is oriented to person, place, and time. He appears well-developed and well-nourished. No distress.   HENT:   Head: Normocephalic and atraumatic.   Nose: Nose normal.   Eyes: Conjunctivae and EOM are normal.   Neck: Normal range of motion. Neck supple.   Cardiovascular: Normal rate, regular rhythm and normal heart sounds.   No murmur heard.  Pulmonary/Chest: Effort normal and breath sounds normal. No respiratory distress. He has no wheezes. He has no rales.   Abdominal: Soft. Bowel sounds are normal. He exhibits no distension. There is no tenderness. There is no guarding.   Musculoskeletal: Normal range of motion.   Full passive and active ROM right hip. No tenderness to palpation.    Neurological: He is alert and oriented to person, place, and time.   Skin: Skin is warm and dry.   Psychiatric: He has a normal mood and affect. His behavior is normal.   Vitals reviewed.        Data Reviewed:     LABS:   Lab Results   Component Value Date    GLUCOSE 88 02/21/2018    BUN 12 02/21/2018    CREATININE 0.86 02/21/2018    EGFRIFNONA 92 02/21/2018    BCR 14.0 02/21/2018    K 4.0 02/21/2018    CO2 25.0 02/21/2018    CALCIUM 9.5 02/21/2018    ALBUMIN 4.30 02/21/2018    AST 33 02/21/2018    ALT 49 02/21/2018     Lab Results   Component Value Date    WBC 8.31 07/11/2017    HGB 16.4 07/11/2017    HCT 47.4 07/11/2017    MCV 95.4 07/11/2017     07/11/2017     Lab Results   Component Value Date    CHOL 230 (H) 07/11/2017    TRIG 509 (H) 07/11/2017    HDL 29 (L) 07/11/2017     (H) 07/11/2017     Lab Results   Component Value Date    TSH 1.910 02/21/2018     Lab Results   Component Value Date    HGBA1C 5.58 08/30/2019     Lab Results   Component Value Date    CREATININE 0.86 02/21/2018     No results found for: IRON, TIBC, FERRITIN    Assessment/Plan     Right Hip Pain  -follow up with orthopedics    Hashimoto  -recheck labs today, encouraged him to  go to the lab    Seizure Disoder  -continue Keppra    HLD  -Lipitor  -check Lipid Panel    Weight  -Class: Overweight: 25.0-29.9kg/m2   -Patient's Body mass index is 29.4 kg/m². BMI is above normal parameters. Recommendations include: exercise counseling and nutrition counseling.      Nicotine status  reports that he has been smoking cigarettes. He has a 15.00 pack-year smoking history. He has never used smokeless tobacco.   I advised Martin of the risks of continuing to use tobacco, and I provided him with tobacco cessation educational materials in the After Visit Summary.     During this visit, I spent 6 minutes counseling the patient regarding tobacco cessation.    Alcohol use:  reports that he does not drink alcohol.    Health Maintenance  Health Maintenance   Topic Date Due   • ANNUAL PHYSICAL  [birth year]01/1963   • ZOSTER VACCINE (1 of 2) 10/01/2011   • COLONOSCOPY  01/01/2025   • TDAP/TD VACCINES (2 - Td) 01/28/2026   • PNEUMOCOCCAL VACCINE (19-64 MEDIUM RISK)  Completed   • HEPATITIS C SCREENING  Completed   • INFLUENZA VACCINE  Addressed       FOLLOW-UP  Return in about 3 months (around 2/19/2020) for Recheck, please have him get labs prior to the visit .      ORDERS  Medications Discontinued During This Encounter   Medication Reason   • atorvastatin (LIPITOR) 40 MG tablet Reorder   • baclofen (LIORESAL) 10 MG tablet Reorder   • levETIRAcetam (KEPPRA) 1000 MG tablet Reorder   • levothyroxine (SYNTHROID, LEVOTHROID) 25 MCG tablet Reorder   • raNITIdine (ZANTAC) 150 MG tablet Reorder     Martin was seen today for abdominal pain and hip pain.    Diagnoses and all orders for this visit:    Dyslipidemia  -     atorvastatin (LIPITOR) 40 MG tablet; Take 1 tablet by mouth Daily.    Chronic bilateral low back pain without sciatica  -     baclofen (LIORESAL) 10 MG tablet; Take 1 tablet by mouth 3 (Three) Times a Day.    Seizure disorder (CMS/HCC)  -     levETIRAcetam (KEPPRA) 1000 MG tablet; Take 1 tablet by mouth 2 (Two)  Times a Day.    Hashimoto's thyroiditis  -     levothyroxine (SYNTHROID, LEVOTHROID) 25 MCG tablet; Take 1 tablet by mouth Daily.    Gastroesophageal reflux disease, esophagitis presence not specified  -     raNITIdine (ZANTAC) 150 MG tablet; Take 1 tablet by mouth Every Night.        Goals:   Goals     • Less pain      Barriers:  none            RISK SCORE: 4        This document has been electronically signed by Monica Wilkinson MD on December 12, 2019 1:18 PM

## 2019-12-18 NOTE — PROGRESS NOTES
I have seen the patient.  I have reviewed the notes, assessments, and/or procedures performed by Monica Wilkinson MD during office visit I concur with her/his documentation and assessment and plan for Martin Bender.            This document has been electronically signed by Liborio Velazquez MD on December 18, 2019 11:21 AM

## 2020-02-10 DIAGNOSIS — G89.29 CHRONIC BILATERAL LOW BACK PAIN WITHOUT SCIATICA: ICD-10-CM

## 2020-02-10 DIAGNOSIS — M54.50 CHRONIC BILATERAL LOW BACK PAIN WITHOUT SCIATICA: ICD-10-CM

## 2020-02-11 RX ORDER — BACLOFEN 10 MG/1
10 TABLET ORAL 3 TIMES DAILY
Qty: 90 TABLET | Refills: 2 | Status: SHIPPED | OUTPATIENT
Start: 2020-02-11 | End: 2020-05-13 | Stop reason: SDUPTHER

## 2020-03-09 DIAGNOSIS — E06.3 HASHIMOTO'S THYROIDITIS: ICD-10-CM

## 2020-03-09 RX ORDER — LEVOTHYROXINE SODIUM 0.03 MG/1
25 TABLET ORAL DAILY
Qty: 30 TABLET | Refills: 2 | Status: SHIPPED | OUTPATIENT
Start: 2020-03-09 | End: 2020-06-04 | Stop reason: SDUPTHER

## 2020-05-13 DIAGNOSIS — M54.50 CHRONIC BILATERAL LOW BACK PAIN WITHOUT SCIATICA: ICD-10-CM

## 2020-05-13 DIAGNOSIS — G89.29 CHRONIC BILATERAL LOW BACK PAIN WITHOUT SCIATICA: ICD-10-CM

## 2020-05-13 RX ORDER — BACLOFEN 10 MG/1
10 TABLET ORAL 3 TIMES DAILY
Qty: 90 TABLET | Refills: 2 | Status: SHIPPED | OUTPATIENT
Start: 2020-05-13 | End: 2020-08-13 | Stop reason: SDUPTHER

## 2020-06-04 ENCOUNTER — TELEPHONE (OUTPATIENT)
Dept: FAMILY MEDICINE CLINIC | Facility: CLINIC | Age: 60
End: 2020-06-04

## 2020-06-04 DIAGNOSIS — E06.3 HASHIMOTO'S THYROIDITIS: ICD-10-CM

## 2020-06-04 RX ORDER — LEVOTHYROXINE SODIUM 0.03 MG/1
25 TABLET ORAL DAILY
Qty: 30 TABLET | Refills: 0 | Status: SHIPPED | OUTPATIENT
Start: 2020-06-04 | End: 2020-07-06 | Stop reason: SDUPTHER

## 2020-07-06 DIAGNOSIS — E06.3 HASHIMOTO'S THYROIDITIS: ICD-10-CM

## 2020-07-06 RX ORDER — LEVOTHYROXINE SODIUM 0.03 MG/1
25 TABLET ORAL DAILY
Qty: 30 TABLET | Refills: 5 | Status: SHIPPED | OUTPATIENT
Start: 2020-07-06 | End: 2021-01-26 | Stop reason: SDUPTHER

## 2020-08-13 DIAGNOSIS — G89.29 CHRONIC BILATERAL LOW BACK PAIN WITHOUT SCIATICA: ICD-10-CM

## 2020-08-13 DIAGNOSIS — M54.50 CHRONIC BILATERAL LOW BACK PAIN WITHOUT SCIATICA: ICD-10-CM

## 2020-08-13 RX ORDER — BACLOFEN 10 MG/1
10 TABLET ORAL 3 TIMES DAILY
Qty: 90 TABLET | Refills: 2 | Status: SHIPPED | OUTPATIENT
Start: 2020-08-13 | End: 2021-01-22

## 2021-01-22 ENCOUNTER — TELEPHONE (OUTPATIENT)
Dept: FAMILY MEDICINE CLINIC | Facility: CLINIC | Age: 61
End: 2021-01-22

## 2021-01-22 ENCOUNTER — PATIENT OUTREACH (OUTPATIENT)
Dept: FAMILY MEDICINE CLINIC | Facility: CLINIC | Age: 61
End: 2021-01-22

## 2021-01-22 DIAGNOSIS — M54.50 CHRONIC BILATERAL LOW BACK PAIN WITHOUT SCIATICA: ICD-10-CM

## 2021-01-22 DIAGNOSIS — G89.29 CHRONIC BILATERAL LOW BACK PAIN WITHOUT SCIATICA: ICD-10-CM

## 2021-01-22 DIAGNOSIS — G40.909 SEIZURE DISORDER (HCC): ICD-10-CM

## 2021-01-22 RX ORDER — BACLOFEN 10 MG/1
TABLET ORAL
Qty: 90 TABLET | Refills: 2 | Status: SHIPPED | OUTPATIENT
Start: 2021-01-22 | End: 2021-05-06

## 2021-01-22 RX ORDER — LEVETIRACETAM 1000 MG/1
1000 TABLET ORAL 2 TIMES DAILY
Qty: 14 TABLET | Refills: 0 | Status: SHIPPED | OUTPATIENT
Start: 2021-01-22 | End: 2021-01-26 | Stop reason: SDUPTHER

## 2021-01-22 NOTE — TELEPHONE ENCOUNTER
Called patient to schedule apt for med refills and overdue f/u. Patient last seen 2019 by Dr Wilkinson.     Hi can send enough meds until his apt (if patient is still seeing a dr in our office)     Shahla PARSONS     [phone]

## 2021-01-25 NOTE — PROGRESS NOTES
"  Family Medicine Residency  Channing Gomez MD    Subjective:     Martin Bender is a 60 y.o. male with a concurrent medical history of seizure disorder, hyperparathyroidism, dyslipidemia, and tobacco dependence and a past medical history of stroke who presents today for medication refill.  He has a potential history of Hashimoto's thyroiditis and hepatitis C. Mr. Bender has not been to the clinic since 2019.  He was formerly seen by Dr. Wilkinson and is assigned to Dr. Elva Robert but has not been seen by him yet.    Mr. Bender is a somewhat poor historian and has a poor understanding of his own health and medical history.  He has no complaints currently and is primarily here for refills on his medications.    Mr. Bender tells me that he has a history of seizures going back decades. His seizures occurred prior to his stroke and have been well controlled with Keppra for 20 years.  \"My last seizure was over 10 years ago.\"  A previous Keppra level was ordered by Dr. Wilkinson but never obtained, likely due to the patient's history of spotty follow-up.    Mr. Bender has a past medical history of hyperparathyroidism.  He had a previous intact PTH of 93.0 on 6/28/2016.  He denies abdominal pain, weakness, and polyuria, but does endorse some fatigue and possible bone pain.  Or significantly, he has had a significant history of renal stones and tells me that he has undergone \"27 lithotripsies\" indicating symptomatic hyperparathyroidism that has not been fully addressed.  Dr. Wilkinson previously ordered labs to work-up his hyperparathyroidism, but these were not obtained before today, likely due to the patient's history of spotty follow-up.    Mr. Bender takes atorvastatin for his dyslipidemia.  He has not had a lipid panel in over two years.  Dr. Wilkinson previously ordered a lipid panel, but it was not obtained, likely due to the patient's history of spotty follow-up    Mr. Bender takes Synthroid but is not sure why.  He has a " "possible history of Hashimoto's thyroiditis, however he did not follow-up to receive TSH and T4 levels that were ordered for him over the past two years.    Mr. Bender has smoked up to a pack of cigarettes daily for 40 years but has not had a low-dose CT of chest to screen for lung cancer based on my reading of his records, though he thought during our encounter he had one before.    Mr. Bender had a previous positive hepatitis C antibody on 7/11/2017.  However, a hepatitis C RNA test did not detect hepatitis C, and he is unaware of a diagnosis of hepatitis C.    Mr. Bender has a history of gastroesophageal reflux disease but does not currently take medicine after discontinuing his Zantac.  He is not interested in any new medicines at this time and states that his reflux is currently well controlled as long as he avoids spicy foods.    The following portions of the patient's history were reviewed and updated as appropriate: allergies, current medications, past family history, past medical history, past social history, past surgical history and problem list.    Past Medical Hx:  Past Medical History:   Diagnosis Date   • Age related cataract     right   • Artificial lens present     in position   • Dyslipidemia    • Encounter for general adult medical examination without abnormal findings    • Gastroesophageal reflux disease    • Hashimoto's thyroiditis    • Headache    • Kidney stone    • Primary hyperparathyroidism (CMS/HCC)     possible primary normocalcemic but vit D has to be corrected first   • Secondary hyperparathyroidism (CMS/HCC)    • Seizure disorder (CMS/HCC)        Past Surgical Hx:  Past Surgical History:   Procedure Laterality Date   • COLONOSCOPY  2015   • CYSTOSCOPY  01/11/2016    Left ureteroscopy with laser lithotripsy.Left retrograde pyelography.Left double J stent insertion, double J stent with no tether. [facility]. Per patient had stone removal \"25 times\"       Current " "Meds:    Current Outpatient Medications:   •  aspirin 81 MG chewable tablet, Chew 1 tablet Daily., Disp: 30 tablet, Rfl: 11  •  atorvastatin (LIPITOR) 40 MG tablet, Take 1 tablet by mouth Daily., Disp: 30 tablet, Rfl: 11  •  baclofen (LIORESAL) 10 MG tablet, TAKE 1 TABLET BY MOUTH THREE TIMES DAILY, Disp: 90 tablet, Rfl: 2  •  levETIRAcetam (KEPPRA) 1000 MG tablet, Take 1 tablet by mouth 2 (Two) Times a Day., Disp: 14 tablet, Rfl: 0  •  levothyroxine (SYNTHROID, LEVOTHROID) 25 MCG tablet, Take 1 tablet by mouth Daily., Disp: 30 tablet, Rfl: 5  •  raNITIdine (ZANTAC) 150 MG tablet, Take 1 tablet by mouth Every Night., Disp: 60 tablet, Rfl: 11    Allergies:  Allergies   Allergen Reactions   • Penicillins Rash       Family Hx:  Family History   Problem Relation Age of Onset   • Cancer Other    • Diabetes Other    • Heart disease Other    • Hypertension Other         Social History:  Social History     Socioeconomic History   • Marital status: Single     Spouse name: Not on file   • Number of children: Not on file   • Years of education: Not on file   • Highest education level: Not on file   Tobacco Use   • Smoking status: Current Every Day Smoker     Packs/day: 0.50     Years: 30.00     Pack years: 15.00     Types: Cigarettes   • Smokeless tobacco: Never Used   Substance and Sexual Activity   • Alcohol use: No   • Drug use: No       Review of Systems  Review of Systems   Constitutional: Positive for fatigue.   Gastrointestinal: Negative for abdominal pain.   Genitourinary: Negative for frequency.   Neurological: Negative for seizures and weakness.       Objective:     /80   Pulse 105   Ht 182.9 cm (72\")   Wt 111 kg (244 lb 1.6 oz)   SpO2 95%   BMI 33.11 kg/m²   Physical Exam  Constitutional:       General: He is not in acute distress.     Appearance: Normal appearance. He is obese. He is not ill-appearing, toxic-appearing or diaphoretic.   HENT:      Head: Normocephalic and atraumatic.   Neck:      " "Musculoskeletal: Normal range of motion.   Cardiovascular:      Rate and Rhythm: Normal rate and regular rhythm.   Pulmonary:      Effort: Pulmonary effort is normal. No respiratory distress.   Neurological:      Mental Status: He is alert.          Assessment/Plan:     Martin Bender is a 60 y.o. male with a concurrent medical history of seizure disorder, hyperparathyroidism, dyslipidemia, and tobacco dependence who presents today for medication refill.      Diagnoses and all orders for this visit:    1. Seizure disorder (CMS/[facility]) (Primary)  Mr. Bender tells me that he has a history of seizures going back decades. His seizures occurred prior to his stroke and have been well controlled with Keppra for 20 years.  \"My last seizure was over 10 years ago.\"  A previous Keppra level was ordered by Dr. Wilkinson but never obtained, likely due to the patient's history of spotty follow-up.  I will refill his Keppra at this time and obtain a Keppra level on him today due to his history of poor follow-up.    -     Levetiracetam Level (Keppra)  -     CBC w AUTO Differential  -     levETIRAcetam (KEPPRA) 1000 MG tablet; Take 1 tablet by mouth 2 (Two) Times a Day.  Dispense: 14 tablet; Refill: 0    2. Hashimoto's thyroiditis  Mr. Bender takes Synthroid but is not sure why.  He has a possible history of Hashimoto's thyroiditis, however he did not follow-up to receive TSH and T4 levels that were ordered for him over the past two years.  In addition to refilling his Synthroid today, I will order TSH and T4 levels, as well as thyroid antibodies to confirm this diagnosis.    -     TSH  -     T4, free  -     Thyroid Antibodies  -     levothyroxine (SYNTHROID, LEVOTHROID) 25 MCG tablet; Take 1 tablet by mouth Daily.  Dispense: 30 tablet; Refill: 5    3. Hyperparathyroidism (CMS/[facility])  Mr. Bender has a past medical history of hyperparathyroidism.  He had a previous intact PTH of 93.0 on 6/28/2016.  He denies abdominal pain, weakness, and " "polyuria, but does endorse some fatigue and possible bone pain.  Or significantly, he has had a significant history of renal stones and tells me that he has undergone \"27 lithotripsies\" indicating a symptomatic parathyroid hormone disorder that has not been fully addressed.  Dr. Wilkinson previously ordered labs to work-up his hyperparathyroidism, but these were not obtained before today, likely due to the patient's history of spotty follow-up.  I will order parathyroid and calcium labs on him today, as well as a vitamin D levels.  Parathyroid hormone excess can also cause kidney stones.  In future encounters - increased osteoblast activity is also related to increased alkaline phosphatase, but I did not obtain this lab today.  I also did not ask him about a history of bone fractures, constipation, gallstones, peptic ulcer disease, pancreatitis, which are also associated with hyperparathyroidism. In primary hyperparathyroidism, we should see elevated parathyroid hormone, increased calcium, and decreased phosphorus levels.  These levels are also observed in tertiary hyperparathyroidism.  Treatment of primary hyperparathyroidism and tertiary hyperparathyroidism is surgical resection. Secondary hyperparathyroidism is observed in the setting of chronic kidney disease.  In this setting, there is elevated parathyroid hormone with normal calcium.  A 24-hour urine calcium level can distinguish this from familial hypocalciuric hypercalcemia.    -     PTH, Intact & Calcium  -     Comprehensive metabolic panel  -     Vitamin D 25 hydroxy    4. Dyslipidemia  Mr. Bender takes atorvastatin for his dyslipidemia.  He has not had a lipid panel in over two years.  Dr. Wilkinson previously ordered a lipid panel, but it was not obtained, likely due to the patient's history of spotty follow-up.  I will order repeat lipid panel today and refill his atorvastatin at this time.    -     Lipid panel  -     atorvastatin (LIPITOR) 40 MG tablet; Take " 1 tablet by mouth Daily.  Dispense: 30 tablet; Refill: 11    5. Cerebrovascular accident (CVA), unspecified mechanism (CMS/HCC)  We did not fully explore the patient's history of stroke in today's encounter, other than for me to recommend that he use his aspirin, which she had previously self discontinued, regularly.  This history chould be explored by his PCP and for future encounters.  -     aspirin 81 MG chewable tablet; Chew 1 tablet Daily.  Dispense: 30 tablet; Refill: 11      6. Need for hepatitis C screening test  Mr. Bender had a previous positive hepatitis C antibody on 7/11/2017.  However, a hepatitis C RNA test did not detect hepatitis C, and he is unaware of a diagnosis of hepatitis C. After researching the work-up for hepatitis C, I am ordering a repeat hepatitis C antibody today.  If the result this time is negative, it is less likely the patient has hepatitis C. However, if the result is again positive, further work-up, including possibly ordering the antigen rather than the RNA test, is warranted.  -     Hepatitis C antibody    7. Nicotine dependence  Mr. Bender has smoked up to a pack of cigarettes daily for 40 years but has not had a low-dose CT of chest to screen for lung cancer based on my reading of his records, though he thought during our encounter he had one before.  His PCP could consider ordering a lung cancer screening test for him due to his age and smoking history, as well as exploring smoking cessation counseling in the future.    8.  Gastresophageal reflux disease  This is currently well controlled without the patient needing medication as long as he avoids spicy foods.  I will leave this alone today.      · Rx changes: None  · Patient Education: Counseled quit smoking    Follow-up:     Return in about 1 month (around 2/26/2021) for Symptomatic hyperparathyroidism and possible Hashimoto's thyroiditis.    Preventative:  Health Maintenance   Topic Date Due   • ANNUAL PHYSICAL  10/01/1963   •  ZOSTER VACCINE (1 of 2) 10/01/2011   • INFLUENZA VACCINE  08/01/2020   • COLONOSCOPY  01/01/2025   • TDAP/TD VACCINES (2 - Td) 01/28/2026   • HEPATITIS C SCREENING  Completed   • Pneumococcal Vaccine 0-64  Completed   • MENINGOCOCCAL VACCINE  Aged Out       Alcohol use:  reports no history of alcohol use.  Nicotine status  reports that he has been smoking cigarettes. He has a 15.00 pack-year smoking history. He has never used smokeless tobacco.    Goals     •  Work-up symptomatic hyperparathyroidism      Barriers:  none            RISK SCORE: 3      This document has been electronically signed by Channing Gomez MD on January 27, 2021 18:57 CST

## 2021-01-26 ENCOUNTER — OFFICE VISIT (OUTPATIENT)
Dept: FAMILY MEDICINE CLINIC | Facility: CLINIC | Age: 61
End: 2021-01-26

## 2021-01-26 ENCOUNTER — LAB (OUTPATIENT)
Dept: LAB | Facility: HOSPITAL | Age: 61
End: 2021-01-26

## 2021-01-26 VITALS
SYSTOLIC BLOOD PRESSURE: 130 MMHG | HEART RATE: 105 BPM | WEIGHT: 244.1 LBS | HEIGHT: 72 IN | OXYGEN SATURATION: 95 % | BODY MASS INDEX: 33.06 KG/M2 | DIASTOLIC BLOOD PRESSURE: 80 MMHG

## 2021-01-26 DIAGNOSIS — E78.5 DYSLIPIDEMIA: ICD-10-CM

## 2021-01-26 DIAGNOSIS — Z11.59 NEED FOR HEPATITIS C SCREENING TEST: ICD-10-CM

## 2021-01-26 DIAGNOSIS — E06.3 HASHIMOTO'S THYROIDITIS: ICD-10-CM

## 2021-01-26 DIAGNOSIS — G89.29 CHRONIC BILATERAL LOW BACK PAIN WITHOUT SCIATICA: ICD-10-CM

## 2021-01-26 DIAGNOSIS — K21.9 GASTROESOPHAGEAL REFLUX DISEASE: ICD-10-CM

## 2021-01-26 DIAGNOSIS — M54.50 CHRONIC BILATERAL LOW BACK PAIN WITHOUT SCIATICA: ICD-10-CM

## 2021-01-26 DIAGNOSIS — E21.0 PRIMARY HYPERPARATHYROIDISM (HCC): ICD-10-CM

## 2021-01-26 DIAGNOSIS — I63.9 CEREBROVASCULAR ACCIDENT (CVA), UNSPECIFIED MECHANISM (HCC): ICD-10-CM

## 2021-01-26 DIAGNOSIS — G40.909 SEIZURE DISORDER (HCC): Primary | ICD-10-CM

## 2021-01-26 LAB — HCV AB SER DONR QL: REACTIVE

## 2021-01-26 PROCEDURE — 86376 MICROSOMAL ANTIBODY EACH: CPT | Performed by: STUDENT IN AN ORGANIZED HEALTH CARE EDUCATION/TRAINING PROGRAM

## 2021-01-26 PROCEDURE — 80061 LIPID PANEL: CPT | Performed by: STUDENT IN AN ORGANIZED HEALTH CARE EDUCATION/TRAINING PROGRAM

## 2021-01-26 PROCEDURE — 80177 DRUG SCRN QUAN LEVETIRACETAM: CPT | Performed by: STUDENT IN AN ORGANIZED HEALTH CARE EDUCATION/TRAINING PROGRAM

## 2021-01-26 PROCEDURE — 83970 ASSAY OF PARATHORMONE: CPT | Performed by: STUDENT IN AN ORGANIZED HEALTH CARE EDUCATION/TRAINING PROGRAM

## 2021-01-26 PROCEDURE — 99213 OFFICE O/P EST LOW 20 MIN: CPT | Performed by: STUDENT IN AN ORGANIZED HEALTH CARE EDUCATION/TRAINING PROGRAM

## 2021-01-26 PROCEDURE — 86800 THYROGLOBULIN ANTIBODY: CPT | Performed by: STUDENT IN AN ORGANIZED HEALTH CARE EDUCATION/TRAINING PROGRAM

## 2021-01-26 PROCEDURE — 82310 ASSAY OF CALCIUM: CPT | Performed by: STUDENT IN AN ORGANIZED HEALTH CARE EDUCATION/TRAINING PROGRAM

## 2021-01-26 PROCEDURE — 82306 VITAMIN D 25 HYDROXY: CPT | Performed by: STUDENT IN AN ORGANIZED HEALTH CARE EDUCATION/TRAINING PROGRAM

## 2021-01-26 PROCEDURE — 36415 COLL VENOUS BLD VENIPUNCTURE: CPT | Performed by: STUDENT IN AN ORGANIZED HEALTH CARE EDUCATION/TRAINING PROGRAM

## 2021-01-26 PROCEDURE — 84439 ASSAY OF FREE THYROXINE: CPT | Performed by: STUDENT IN AN ORGANIZED HEALTH CARE EDUCATION/TRAINING PROGRAM

## 2021-01-26 PROCEDURE — 80050 GENERAL HEALTH PANEL: CPT | Performed by: STUDENT IN AN ORGANIZED HEALTH CARE EDUCATION/TRAINING PROGRAM

## 2021-01-26 PROCEDURE — 86803 HEPATITIS C AB TEST: CPT | Performed by: STUDENT IN AN ORGANIZED HEALTH CARE EDUCATION/TRAINING PROGRAM

## 2021-01-26 RX ORDER — LEVETIRACETAM 1000 MG/1
1000 TABLET ORAL 2 TIMES DAILY
Qty: 14 TABLET | Refills: 0 | Status: SHIPPED | OUTPATIENT
Start: 2021-01-26 | End: 2021-02-18 | Stop reason: SDUPTHER

## 2021-01-26 RX ORDER — BACLOFEN 10 MG/1
10 TABLET ORAL 3 TIMES DAILY
Qty: 90 TABLET | Refills: 2 | Status: CANCELLED | OUTPATIENT
Start: 2021-01-26

## 2021-01-26 RX ORDER — ASPIRIN 81 MG/1
81 TABLET, CHEWABLE ORAL DAILY
Qty: 30 TABLET | Refills: 11 | Status: SHIPPED | OUTPATIENT
Start: 2021-01-26 | End: 2022-04-25 | Stop reason: SDUPTHER

## 2021-01-26 RX ORDER — FAMOTIDINE 10 MG
TABLET ORAL
Status: CANCELLED | OUTPATIENT
Start: 2021-01-26

## 2021-01-26 RX ORDER — ATORVASTATIN CALCIUM 40 MG/1
40 TABLET, FILM COATED ORAL DAILY
Qty: 30 TABLET | Refills: 11 | Status: SHIPPED | OUTPATIENT
Start: 2021-01-26 | End: 2021-02-24 | Stop reason: SDUPTHER

## 2021-01-26 RX ORDER — LEVOTHYROXINE SODIUM 0.03 MG/1
25 TABLET ORAL DAILY
Qty: 30 TABLET | Refills: 5 | Status: SHIPPED | OUTPATIENT
Start: 2021-01-26 | End: 2021-02-23

## 2021-01-27 LAB
25(OH)D3 SERPL-MCNC: 10.2 NG/ML (ref 30–100)
ALBUMIN SERPL-MCNC: 3.9 G/DL (ref 3.5–5.2)
ALBUMIN/GLOB SERPL: 1.1 G/DL
ALP SERPL-CCNC: 90 U/L (ref 39–117)
ALT SERPL W P-5'-P-CCNC: 25 U/L (ref 1–41)
ANION GAP SERPL CALCULATED.3IONS-SCNC: 10.3 MMOL/L (ref 5–15)
AST SERPL-CCNC: 16 U/L (ref 1–40)
BASOPHILS # BLD AUTO: 0.06 10*3/MM3 (ref 0–0.2)
BASOPHILS NFR BLD AUTO: 0.8 % (ref 0–1.5)
BILIRUB SERPL-MCNC: 0.2 MG/DL (ref 0–1.2)
BUN SERPL-MCNC: 12 MG/DL (ref 6–20)
BUN/CREAT SERPL: 14.3 (ref 7–25)
CALCIUM SPEC-SCNC: 9.1 MG/DL (ref 8.6–10.5)
CALCIUM SPEC-SCNC: 9.1 MG/DL (ref 8.6–10.5)
CHLORIDE SERPL-SCNC: 104 MMOL/L (ref 98–107)
CHOLEST SERPL-MCNC: 212 MG/DL (ref 0–200)
CO2 SERPL-SCNC: 23.7 MMOL/L (ref 22–29)
CREAT SERPL-MCNC: 0.84 MG/DL (ref 0.76–1.27)
DEPRECATED RDW RBC AUTO: 46.8 FL (ref 37–54)
EOSINOPHIL # BLD AUTO: 0.24 10*3/MM3 (ref 0–0.4)
EOSINOPHIL NFR BLD AUTO: 3.2 % (ref 0.3–6.2)
ERYTHROCYTE [DISTWIDTH] IN BLOOD BY AUTOMATED COUNT: 13.3 % (ref 12.3–15.4)
GFR SERPL CREATININE-BSD FRML MDRD: 94 ML/MIN/1.73
GLOBULIN UR ELPH-MCNC: 3.6 GM/DL
GLUCOSE SERPL-MCNC: 93 MG/DL (ref 65–99)
HCT VFR BLD AUTO: 49.6 % (ref 37.5–51)
HDLC SERPL-MCNC: 31 MG/DL (ref 40–60)
HGB BLD-MCNC: 17.2 G/DL (ref 13–17.7)
IMM GRANULOCYTES # BLD AUTO: 0.04 10*3/MM3 (ref 0–0.05)
IMM GRANULOCYTES NFR BLD AUTO: 0.5 % (ref 0–0.5)
LDLC SERPL CALC-MCNC: 138 MG/DL (ref 0–100)
LDLC/HDLC SERPL: 4.31 {RATIO}
LYMPHOCYTES # BLD AUTO: 2.4 10*3/MM3 (ref 0.7–3.1)
LYMPHOCYTES NFR BLD AUTO: 31.8 % (ref 19.6–45.3)
MCH RBC QN AUTO: 32.8 PG (ref 26.6–33)
MCHC RBC AUTO-ENTMCNC: 34.7 G/DL (ref 31.5–35.7)
MCV RBC AUTO: 94.7 FL (ref 79–97)
MONOCYTES # BLD AUTO: 0.94 10*3/MM3 (ref 0.1–0.9)
MONOCYTES NFR BLD AUTO: 12.5 % (ref 5–12)
NEUTROPHILS NFR BLD AUTO: 3.87 10*3/MM3 (ref 1.7–7)
NEUTROPHILS NFR BLD AUTO: 51.2 % (ref 42.7–76)
NRBC BLD AUTO-RTO: 0 /100 WBC (ref 0–0.2)
PLATELET # BLD AUTO: 352 10*3/MM3 (ref 140–450)
PMV BLD AUTO: 10.4 FL (ref 6–12)
POTASSIUM SERPL-SCNC: 4.4 MMOL/L (ref 3.5–5.2)
PROT SERPL-MCNC: 7.5 G/DL (ref 6–8.5)
PTH-INTACT SERPL-MCNC: 72.3 PG/ML (ref 15–65)
RBC # BLD AUTO: 5.24 10*6/MM3 (ref 4.14–5.8)
SODIUM SERPL-SCNC: 138 MMOL/L (ref 136–145)
T4 FREE SERPL-MCNC: 1.04 NG/DL (ref 0.93–1.7)
TRIGL SERPL-MCNC: 237 MG/DL (ref 0–150)
TSH SERPL DL<=0.05 MIU/L-ACNC: 2.8 UIU/ML (ref 0.27–4.2)
VLDLC SERPL-MCNC: 43 MG/DL (ref 5–40)
WBC # BLD AUTO: 7.55 10*3/MM3 (ref 3.4–10.8)

## 2021-01-28 LAB
THYROGLOB AB SERPL-ACNC: <1 IU/ML (ref 0–0.9)
THYROPEROXIDASE AB SERPL-ACNC: <9 IU/ML (ref 0–34)

## 2021-01-29 NOTE — PROGRESS NOTES
I have helped formulate and discussed the assessment and plan with Dr.Kyle Gomez.  I have also seen and  spoken with the patient  I have seen and spoken with the patient .   I have reviewed the notes, assessments, and/or procedures performed by Dr. Channing Gomez, I concur with his  documentation and assessment and plan for Martin Bender.          This document has been electronically signed by Isaias Ruano MD on January 29, 2021 11:18 CST

## 2021-01-30 LAB — LEVETIRACETAM SERPL-MCNC: 16.9 UG/ML (ref 10–40)

## 2021-02-02 DIAGNOSIS — E34.9 ELEVATED PARATHYROID HORMONE: Primary | ICD-10-CM

## 2021-02-02 DIAGNOSIS — R76.8 POSITIVE HEPATITIS C ANTIBODY TEST: ICD-10-CM

## 2021-02-18 ENCOUNTER — PATIENT OUTREACH (OUTPATIENT)
Dept: FAMILY MEDICINE CLINIC | Facility: CLINIC | Age: 61
End: 2021-02-18

## 2021-02-18 DIAGNOSIS — G40.909 SEIZURE DISORDER (HCC): ICD-10-CM

## 2021-02-18 RX ORDER — LEVETIRACETAM 1000 MG/1
1000 TABLET ORAL 2 TIMES DAILY
Qty: 60 TABLET | Refills: 11 | Status: SHIPPED | OUTPATIENT
Start: 2021-02-18 | End: 2021-03-22 | Stop reason: SDUPTHER

## 2021-02-18 NOTE — OUTREACH NOTE
Refill request for patient came today, originally a short term of keppra was ordered as patient had questionable compliance and medical history.  Discussed with Dr. Gomez who saw patient last keppra level and if he would prefer that patient be placed on this medication long term.  Dr. Gomez did want this done, order placed for him

## 2021-02-23 DIAGNOSIS — E06.3 HASHIMOTO'S THYROIDITIS: ICD-10-CM

## 2021-02-23 RX ORDER — LEVOTHYROXINE SODIUM 0.03 MG/1
25 TABLET ORAL DAILY
Qty: 90 TABLET | Refills: 0 | Status: SHIPPED | OUTPATIENT
Start: 2021-02-23 | End: 2021-03-22 | Stop reason: SDUPTHER

## 2021-02-24 ENCOUNTER — OFFICE VISIT (OUTPATIENT)
Dept: FAMILY MEDICINE CLINIC | Facility: CLINIC | Age: 61
End: 2021-02-24

## 2021-02-24 ENCOUNTER — LAB (OUTPATIENT)
Dept: LAB | Facility: HOSPITAL | Age: 61
End: 2021-02-24

## 2021-02-24 VITALS
BODY MASS INDEX: 34.34 KG/M2 | OXYGEN SATURATION: 98 % | HEIGHT: 72 IN | DIASTOLIC BLOOD PRESSURE: 76 MMHG | WEIGHT: 253.5 LBS | HEART RATE: 100 BPM | TEMPERATURE: 98.6 F | SYSTOLIC BLOOD PRESSURE: 128 MMHG

## 2021-02-24 DIAGNOSIS — E21.0 PRIMARY HYPERPARATHYROIDISM (HCC): Primary | ICD-10-CM

## 2021-02-24 DIAGNOSIS — R76.8 POSITIVE HEPATITIS C ANTIBODY TEST: ICD-10-CM

## 2021-02-24 DIAGNOSIS — E78.5 DYSLIPIDEMIA: ICD-10-CM

## 2021-02-24 PROCEDURE — 99213 OFFICE O/P EST LOW 20 MIN: CPT | Performed by: STUDENT IN AN ORGANIZED HEALTH CARE EDUCATION/TRAINING PROGRAM

## 2021-02-24 PROCEDURE — 87522 HEPATITIS C REVRS TRNSCRPJ: CPT

## 2021-02-24 PROCEDURE — 36415 COLL VENOUS BLD VENIPUNCTURE: CPT

## 2021-02-24 PROCEDURE — 86803 HEPATITIS C AB TEST: CPT

## 2021-02-24 RX ORDER — ATORVASTATIN CALCIUM 80 MG/1
80 TABLET, FILM COATED ORAL DAILY
Qty: 30 TABLET | Refills: 6 | Status: SHIPPED | OUTPATIENT
Start: 2021-02-24 | End: 2022-03-03

## 2021-02-24 NOTE — PROGRESS NOTES
Family Medicine Residency  Channing Gomez MD    Subjective:     Mr. Martin Bender is a 60 y.o. male with a concurrent medical history of seizure disorder, hyperparathyroidism, dyslipidemia, and tobacco dependence and a past medical history of stroke who presents for management of elevated parathyroid hormone and dyslipidemia.     Today, he has no acute complaints.  We went over all his labs from his last visit as I had called him to leave a message about his lab results from our last appointment but he did not receive the message.    He had elevated total cholesterol and LDL despite being on atorvastatin 40.  He continues to smoke cigarettes spite a history of stroke.    He also had a positive hep C antibody test consistent with a previous positive hep C antibody test and a previous negative hepatitis C mRNA test.    He had another elevated parathyroid level and does have a history of having 27 lithotripsies for renal stones.    The following portions of the patient's history were reviewed and updated as appropriate: allergies, current medications, past family history, past medical history, past social history, past surgical history and problem list.    Past Medical Hx:  Past Medical History:   Diagnosis Date   • Age related cataract     right   • Artificial lens present     in position   • Dyslipidemia    • Encounter for general adult medical examination without abnormal findings    • Gastroesophageal reflux disease    • Hashimoto's thyroiditis    • Headache    • Kidney stone    • Primary hyperparathyroidism (CMS/HCC)     possible primary normocalcemic but vit D has to be corrected first   • Secondary hyperparathyroidism (CMS/HCC)    • Seizure disorder (CMS/HCC)        Past Surgical Hx:  Past Surgical History:   Procedure Laterality Date   • COLONOSCOPY  2015   • CYSTOSCOPY  01/11/2016    Left ureteroscopy with laser lithotripsy.Left retrograde pyelography.Left double J stent insertion, double J stent with  "no tether. [facility]. Per patient had stone removal \"25 times\"       Current Meds:    Current Outpatient Medications:   •  aspirin 81 MG chewable tablet, Chew 1 tablet Daily., Disp: 30 tablet, Rfl: 11  •  baclofen (LIORESAL) 10 MG tablet, TAKE 1 TABLET BY MOUTH THREE TIMES DAILY, Disp: 90 tablet, Rfl: 2  •  levETIRAcetam (KEPPRA) 1000 MG tablet, Take 1 tablet by mouth 2 (Two) Times a Day., Disp: 60 tablet, Rfl: 11  •  levothyroxine (SYNTHROID, LEVOTHROID) 25 MCG tablet, TAKE 1 TABLET BY MOUTH DAILY, Disp: 90 tablet, Rfl: 0  •  atorvastatin (Lipitor) 80 MG tablet, Take 1 tablet by mouth Daily., Disp: 30 tablet, Rfl: 6    Allergies:  Allergies   Allergen Reactions   • Penicillins Rash       Family Hx:  Family History   Problem Relation Age of Onset   • Cancer Other    • Diabetes Other    • Heart disease Other    • Hypertension Other         Social History:  Social History     Socioeconomic History   • Marital status: Single     Spouse name: Not on file   • Number of children: Not on file   • Years of education: Not on file   • Highest education level: Not on file   Tobacco Use   • Smoking status: Current Every Day Smoker     Packs/day: 0.50     Years: 30.00     Pack years: 15.00     Types: Cigarettes   • Smokeless tobacco: Never Used   Substance and Sexual Activity   • Alcohol use: No   • Drug use: No       Review of Systems  Review of Systems   Constitutional: Negative for activity change and fever.       Objective:     /76   Pulse 100   Temp 98.6 °F (37 °C)   Ht 182.9 cm (72\")   Wt 115 kg (253 lb 8 oz)   SpO2 98%   BMI 34.38 kg/m²   Physical Exam  Constitutional:       General: He is not in acute distress.     Appearance: Normal appearance. He is obese. He is not ill-appearing, toxic-appearing or diaphoretic.   HENT:      Head: Normocephalic and atraumatic.      Mouth/Throat:      Comments: Stained tongue, presumably from tobacco   Eyes:      Extraocular Movements: Extraocular " "movements intact.   Neck:      Musculoskeletal: Normal range of motion and neck supple.   Cardiovascular:      Rate and Rhythm: Normal rate and regular rhythm.   Pulmonary:      Effort: Pulmonary effort is normal. No respiratory distress.      Breath sounds: Normal breath sounds.   Neurological:      Mental Status: He is alert.          Assessment/Plan:     Mr. Martin Bender is a 60 y.o. male with a concurrent medical history of seizure disorder, hyperparathyroidism, dyslipidemia, and tobacco dependence and a past medical history of stroke who presents for management of elevated parathyroid hormone and dyslipidemia.     Diagnoses and all orders for this visit:    1. Primary hyperparathyroidism (CMS/HCC) (Primary)  As documented previously, Mr. Bender has a past medical history of hyperparathyroidism.  He had a previous intact PTH of 93.0 on 6/28/2016.  I repeated his PTH at his last visit and it was again elevated at 72.3. He has had a significant history of renal stones and tells me that he has undergone \"27 lithotripsies\" indicating a symptomatic parathyroid hormone disorder that has not been fully addressed.  Today, I am ordering a 24-hour urine calcium and a parathyroid scan to continue work-up for hyperparathyroidism.   Depending on his results, he may need a referral to general surgery.    -     Calcium, Urine, 24 Hour - Urine, Clean Catch  -     NM Parathyroid Scan w SPECT; Future    2. Dyslipidemia  Mr. Bender takes atorvastatin but not had a lipid panel in over two years. His total cholesterol and LDL that I obtained in January were elevated.  I am increasing his atorvastatin to 80mg.    -     atorvastatin (Lipitor) 80 MG tablet; Take 1 tablet by mouth Daily.  Dispense: 30 tablet; Refill: 6    3. Positive Hep C antibody  As documented previously, Mr. Bender had a previous positive hepatitis C antibody on 7/11/2017.  However, a hepatitis C RNA test did not detect hepatitis C, and he is unaware of a diagnosis " of hepatitis C. I ordered a repeat hepatitis C antibody at his last visit and it was again positive.  It is possible that he was exposed to hepatitis C and his body cleared the infection.  He had additional testing today, and I will call him with that result when it comes in I suspect they will be negative.    4. Nicotine Dependence   As documented at our last encounter, Mr. Bender has smoked up to a pack of cigarettes daily for 40 years but has not had a low-dose CT of chest to screen for lung cancer based on my reading of his records, though he thought during our encounter he had one before.  In a future encounter, I will attempt to order a lung cancer screening test for him due to his age and smoking history, as well as exploring smoking cessation counseling in the future.  I did tell him today that he should stop smoking and that it puts him at increased risk of another stroke.         · Rx changes: Increase atorvastatin to 80 mg due to patient persistent elevated cholesterol on lipid panel  · Patient Education: Encouraged to quit smoking due to history of stroke         Follow-up:     Return in about 6 weeks (around 4/7/2021).    Preventative:  Health Maintenance   Topic Date Due   • ANNUAL PHYSICAL  10/01/1963   • ZOSTER VACCINE (1 of 2) 10/01/2011   • INFLUENZA VACCINE  08/01/2020   • COLONOSCOPY  01/01/2025   • TDAP/TD VACCINES (2 - Td) 01/28/2026   • HEPATITIS C SCREENING  Completed   • Pneumococcal Vaccine 0-64  Completed   • MENINGOCOCCAL VACCINE  Aged Out     Alcohol use:  reports no history of alcohol use.  Nicotine status  reports that he has been smoking cigarettes. He has a 15.00 pack-year smoking history. He has never used smokeless tobacco.    Goals     •  Determine cause of elevated parathyroid hormone      Barriers: Patient follow-up            RISK SCORE: 4      This document has been electronically signed by Channing Gomez MD on February 24, 2021 11:44 CST

## 2021-02-26 LAB
HCV GENTYP SERPL NAA+PROBE: NORMAL
HCV RNA SERPL NAA+PROBE-ACNC: NORMAL IU/ML
HCV RNA SERPL NAA+PROBE-LOG IU: NORMAL {LOG_IU}/ML
REF LAB TEST REF RANGE: NORMAL

## 2021-02-26 NOTE — PROGRESS NOTES
I contacted the patient to go over his hepatitis C results.  He had a history of 2 different antibody tests that were positive and 2 sets of negative RNA test, indicating previous exposure with any infection likely cleared from body.  He did not answer so I left a message.    ?  This document has been electronically signed by Channing Gomez MD on February 26, 2021 16:39 CST

## 2021-02-27 LAB
DIAGNOSTIC IMP SPEC-IMP: NORMAL
HCV AB S/CO SERPL IA: >11 S/CO RATIO (ref 0–0.9)
HCV GENTYP SERPL NAA+PROBE: NORMAL
HCV RNA SERPL NAA+PROBE-ACNC: NORMAL IU/ML
REF LAB TEST REF RANGE: NORMAL

## 2021-03-19 ENCOUNTER — TELEPHONE (OUTPATIENT)
Dept: FAMILY MEDICINE CLINIC | Facility: CLINIC | Age: 61
End: 2021-03-19

## 2021-03-19 NOTE — PROGRESS NOTES
Attempted to call the patient multiple times to let him know about the missed parathyroid scan appointment. Left voicemail and a letter was sent out to the patient from the radiology department.         Janell Faulkner MD PGY-1  [facility] Family Medicine Residency   This document has been electronically signed by Janell Faulkner MD on March 19, 2021 13:43 CDT

## 2021-03-22 DIAGNOSIS — G40.909 SEIZURE DISORDER (HCC): ICD-10-CM

## 2021-03-22 DIAGNOSIS — E06.3 HASHIMOTO'S THYROIDITIS: ICD-10-CM

## 2021-03-23 RX ORDER — LEVOTHYROXINE SODIUM 0.03 MG/1
25 TABLET ORAL DAILY
Qty: 90 TABLET | Refills: 0 | Status: SHIPPED | OUTPATIENT
Start: 2021-03-23 | End: 2022-04-25 | Stop reason: SDUPTHER

## 2021-03-23 RX ORDER — LEVETIRACETAM 1000 MG/1
1000 TABLET ORAL 2 TIMES DAILY
Qty: 60 TABLET | Refills: 11 | Status: SHIPPED | OUTPATIENT
Start: 2021-03-23 | End: 2022-04-25 | Stop reason: SDUPTHER

## 2021-04-13 ENCOUNTER — HOSPITAL ENCOUNTER (EMERGENCY)
Facility: HOSPITAL | Age: 61
Discharge: HOME | End: 2021-04-13
Attending: FAMILY MEDICINE | Admitting: FAMILY MEDICINE

## 2021-04-13 ENCOUNTER — APPOINTMENT (OUTPATIENT)
Dept: GENERAL RADIOLOGY | Facility: HOSPITAL | Age: 61
End: 2021-04-13

## 2021-04-13 VITALS
HEART RATE: 95 BPM | WEIGHT: 245 LBS | HEIGHT: 74 IN | DIASTOLIC BLOOD PRESSURE: 80 MMHG | BODY MASS INDEX: 31.44 KG/M2 | OXYGEN SATURATION: 95 % | TEMPERATURE: 98 F | SYSTOLIC BLOOD PRESSURE: 101 MMHG | RESPIRATION RATE: 18 BRPM

## 2021-04-13 DIAGNOSIS — M54.50 LOW BACK PAIN WITHOUT SCIATICA, UNSPECIFIED BACK PAIN LATERALITY, UNSPECIFIED CHRONICITY: Primary | ICD-10-CM

## 2021-04-13 DIAGNOSIS — M54.9 UPPER BACK PAIN: ICD-10-CM

## 2021-04-13 LAB
BILIRUB UR QL STRIP: NEGATIVE
CLARITY UR: CLEAR
COLOR UR: YELLOW
GLUCOSE UR STRIP-MCNC: NEGATIVE MG/DL
HGB UR QL STRIP.AUTO: NEGATIVE
KETONES UR QL STRIP: NEGATIVE
LEUKOCYTE ESTERASE UR QL STRIP.AUTO: NEGATIVE
NITRITE UR QL STRIP: NEGATIVE
PH UR STRIP.AUTO: 5.5 [PH] (ref 5–9)
PROT UR QL STRIP: NEGATIVE
SP GR UR STRIP: 1.03 (ref 1–1.03)
UROBILINOGEN UR QL STRIP: NORMAL

## 2021-04-13 PROCEDURE — 72072 X-RAY EXAM THORAC SPINE 3VWS: CPT

## 2021-04-13 PROCEDURE — 81003 URINALYSIS AUTO W/O SCOPE: CPT | Performed by: STUDENT IN AN ORGANIZED HEALTH CARE EDUCATION/TRAINING PROGRAM

## 2021-04-13 PROCEDURE — 72100 X-RAY EXAM L-S SPINE 2/3 VWS: CPT

## 2021-04-13 PROCEDURE — 99283 EMERGENCY DEPT VISIT LOW MDM: CPT

## 2021-04-13 RX ORDER — HYDROCODONE BITARTRATE AND ACETAMINOPHEN 5; 325 MG/1; MG/1
1 TABLET ORAL EVERY 6 HOURS PRN
Qty: 12 TABLET | Refills: 0 | Status: SHIPPED | OUTPATIENT
Start: 2021-04-13 | End: 2022-04-25

## 2021-04-13 NOTE — ED PROVIDER NOTES
Subjective   History of Present Illness  60 yr old male with history of primary hyperparathyroidism, renal stones presents today with complains of low back pain that started around 10 pm last night and got worse this morning. He took ibuprofen which only helped for 2-3 hrs and the pain has now returned. Patient can ambulate without any trouble. Patient denies numbness or weakness in his lower extremities, loss of bowel or bladder function, fevers/chills, trauma or falls.       Review of Systems   Constitutional: Negative for activity change, appetite change, chills, diaphoresis, fatigue and fever.   Respiratory: Negative for cough, chest tightness, shortness of breath and wheezing.    Cardiovascular: Negative for chest pain, palpitations and leg swelling.   Gastrointestinal: Negative for abdominal pain, constipation, diarrhea, nausea and vomiting.   Genitourinary: Negative for difficulty urinating, dysuria, flank pain, frequency, hematuria and urgency.   Musculoskeletal: Positive for back pain. Negative for arthralgias, gait problem, joint swelling and myalgias.   Skin: Negative for color change.   Neurological: Negative for dizziness, syncope, weakness, light-headedness, numbness and headaches.   Psychiatric/Behavioral: Negative for behavioral problems, confusion and sleep disturbance.       Past Medical History:   Diagnosis Date   • Age related cataract     right   • Artificial lens present     in position   • Dyslipidemia    • Encounter for general adult medical examination without abnormal findings    • Gastroesophageal reflux disease    • Hashimoto's thyroiditis    • Headache    • Kidney stone    • Primary hyperparathyroidism (CMS/HCC)     possible primary normocalcemic but vit D has to be corrected first   • Secondary hyperparathyroidism (CMS/HCC)    • Seizure disorder (CMS/HCC)        Allergies   Allergen Reactions   • Penicillins Rash       Past Surgical History:   Procedure Laterality Date   • COLONOSCOPY   "2015   • CYSTOSCOPY  01/11/2016    Left ureteroscopy with laser lithotripsy.Left retrograde pyelography.Left double J stent insertion, double J stent with no tether. [facility]. Per patient had stone removal \"25 times\"       Family History   Problem Relation Age of Onset   • Cancer Other    • Diabetes Other    • Heart disease Other    • Hypertension Other        Social History     Socioeconomic History   • Marital status: Single     Spouse name: Not on file   • Number of children: Not on file   • Years of education: Not on file   • Highest education level: Not on file   Tobacco Use   • Smoking status: Current Every Day Smoker     Packs/day: 0.50     Years: 30.00     Pack years: 15.00     Types: Cigarettes   • Smokeless tobacco: Never Used   Substance and Sexual Activity   • Alcohol use: No   • Drug use: No           Objective   Physical Exam  Vitals and nursing note reviewed.   Constitutional:       General: He is not in acute distress.     Appearance: Normal appearance. He is obese. He is not ill-appearing or toxic-appearing.   HENT:      Head: Normocephalic and atraumatic.      Right Ear: External ear normal.      Left Ear: External ear normal.   Cardiovascular:      Rate and Rhythm: Normal rate and regular rhythm.      Pulses: Normal pulses.      Heart sounds: Normal heart sounds.   Pulmonary:      Effort: Pulmonary effort is normal. No respiratory distress.      Breath sounds: Normal breath sounds. No wheezing.   Abdominal:      General: Bowel sounds are normal.      Palpations: Abdomen is soft.      Tenderness: There is no abdominal tenderness.   Musculoskeletal:         General: Normal range of motion.      Right lower leg: No edema.      Left lower leg: No edema.      Comments: No point tenderness noted along the spinal process. Range of motion was normal.    Skin:     General: Skin is warm and dry.      Capillary Refill: Capillary refill takes less than 2 seconds.   Neurological:      " General: No focal deficit present.      Mental Status: He is alert and oriented to person, place, and time.   Psychiatric:         Mood and Affect: Mood normal.         Behavior: Behavior normal.         Procedures           ED Course  ED Course as of Apr 13 1528   Tue Apr 13, 2021   1520 Request # : [id number]    [CB]      ED Course User Index  [CB] Norm Talley MD      Results for orders placed or performed during the hospital encounter of 04/13/21   Urinalysis With Culture If Indicated - Urine, Random Void    Specimen: Urine, Random Void   Result Value Ref Range    Color, UA Yellow Yellow, Straw, Dark Yellow, Danyell    Appearance, UA Clear Clear    pH, UA 5.5 5.0 - 9.0    Specific [place], UA 1.026 1.003 - 1.030    Glucose, UA Negative Negative    Ketones, UA Negative Negative    Bilirubin, UA Negative Negative    Blood, UA Negative Negative    Protein, UA Negative Negative    Leuk Esterase, UA Negative Negative    Nitrite, UA Negative Negative    Urobilinogen, UA 1.0 E.U./dL 0.2 - 1.0 E.U./dL     XR Spine Thoracic 3 View    Result Date: 4/13/2021  Age-indeterminate mild superior wedging of a midthoracic vertebral body, possibly T6. Correlation with focal patient pain is recommended. Electronically signed by:  Gumaro Bellamy MD  4/13/2021 2:50 PM CDT Workstation: [id number]    XR Spine Lumbar 2 or 3 View    Result Date: 4/13/2021  Degenerative changes along the lumbar spine, as above, most marked at L5-S1. Electronically signed by:  Gumaro Bellamy MD  4/13/2021 2:41 PM CDT Workstation: [id number]                                         MDM  Number of Diagnoses or Management Options  Low back pain without sciatica, unspecified back pain laterality, unspecified chronicity  Upper back pain  Diagnosis management comments: Patient presented to ed with stable vitals. X-ray of the lumbar and thoracic region showed mild wedging of the mid thoracic vertebral body. No neurological deficits at the time of exam. Patient  discharged with prescription for pain medication and advised to follow up with pcp.        Amount and/or Complexity of Data Reviewed  Clinical lab tests: reviewed  Tests in the radiology section of CPT®: reviewed        Final diagnoses:   Low back pain without sciatica, unspecified back pain laterality, unspecified chronicity   Upper back pain       ED Disposition  ED Disposition     ED Disposition Condition Comment    Discharge Stable           Channing Gomez MD  [address] [place] [id number] [phone]    Schedule an appointment as soon as possible for a visit            Medication List      New Prescriptions    HYDROcodone-acetaminophen 5-325 MG per tablet  Commonly known as: NORCO  Take 1 tablet by mouth Every 6 (Six) Hours As Needed for Moderate Pain .           Where to Get Your Medications      These medications were sent to [id number] - [place], [place] - [address] AT [facility] - [phone]  - [phone]   [address] [address]    Phone: [phone]   · HYDROcodone-acetaminophen 5-325 MG per tablet          Janell Faulkner MD  Resident  04/13/21 7752

## 2021-04-13 NOTE — DISCHARGE INSTRUCTIONS
Return to ED if pain is worse, start to develop weakness, numbness in extremities, having urinary or bowel incontinence, fevers or chills. Please follow up with pcp.

## 2021-04-15 ENCOUNTER — OFFICE VISIT (OUTPATIENT)
Dept: FAMILY MEDICINE CLINIC | Facility: CLINIC | Age: 61
End: 2021-04-15

## 2021-04-15 VITALS
SYSTOLIC BLOOD PRESSURE: 142 MMHG | WEIGHT: 248 LBS | DIASTOLIC BLOOD PRESSURE: 78 MMHG | OXYGEN SATURATION: 95 % | HEIGHT: 74 IN | TEMPERATURE: 97.3 F | BODY MASS INDEX: 31.83 KG/M2 | HEART RATE: 114 BPM

## 2021-04-15 DIAGNOSIS — M51.36 DDD (DEGENERATIVE DISC DISEASE), LUMBAR: Primary | ICD-10-CM

## 2021-04-15 PROCEDURE — 99213 OFFICE O/P EST LOW 20 MIN: CPT | Performed by: STUDENT IN AN ORGANIZED HEALTH CARE EDUCATION/TRAINING PROGRAM

## 2021-04-15 RX ORDER — MELOXICAM 15 MG/1
15 TABLET ORAL DAILY
Qty: 30 TABLET | Refills: 3 | Status: SHIPPED | OUTPATIENT
Start: 2021-04-15 | End: 2021-12-22 | Stop reason: SDUPTHER

## 2021-04-15 NOTE — PROGRESS NOTES
I have seen the patient.  I have reviewed the notes, assessments, and/or procedures performed by Dr. Damon, I concur with her/his documentation and assessment and plan for Martin Bender.               This document has been electronically signed by Gerson Lynch MD on April 15, 2021 14:57 CDT

## 2021-04-15 NOTE — PROGRESS NOTES
"ID: Martin Bender    CC:   Chief Complaint   Patient presents with   • Back Pain       Subjective:     HPI     Martin Bender is a 60 y.o. male who presents for back pain. Patient states he was in the er bc his back was hurting. Patient states this is a chronic problem. He had back surgery years ago and will probably be needing more. No numbness/tingling.     Preventative:  Over the past 2 weeks, have you felt down, depressed, or hopeless? no  Over the past 2 weeks, have you felt little interest or pleasure in doing things? no  Clinical depression screening refused by patient no    On osteoporosis therapy? no    Past Medical Hx:  Past Medical History:   Diagnosis Date   • Age related cataract     right   • Artificial lens present     in position   • Dyslipidemia    • Encounter for general adult medical examination without abnormal findings    • Gastroesophageal reflux disease    • Hashimoto's thyroiditis    • Headache    • Kidney stone    • Primary hyperparathyroidism (CMS/HCC)     possible primary normocalcemic but vit D has to be corrected first   • Secondary hyperparathyroidism (CMS/HCC)    • Seizure disorder (CMS/HCC)        Past Surgical Hx:  Past Surgical History:   Procedure Laterality Date   • COLONOSCOPY  2015   • CYSTOSCOPY  01/11/2016    Left ureteroscopy with laser lithotripsy.Left retrograde pyelography.Left double J stent insertion, double J stent with no tether. [facility]. Per patient had stone removal \"25 times\"       Health Maintenance:  Health Maintenance   Topic Date Due   • ANNUAL PHYSICAL  Never done   • ZOSTER VACCINE (1 of 2) Never done   • COVID-19 Vaccine (2 - Pfizer 2-dose series) 04/19/2021   • INFLUENZA VACCINE  08/01/2021   • COLONOSCOPY  01/01/2025   • TDAP/TD VACCINES (2 - Td) 01/28/2026   • HEPATITIS C SCREENING  Completed   • Pneumococcal Vaccine 0-64  Completed       Current Meds:    Current Outpatient Medications:   •  aspirin 81 MG chewable tablet, Chew 1 " tablet Daily., Disp: 30 tablet, Rfl: 11  •  atorvastatin (Lipitor) 80 MG tablet, Take 1 tablet by mouth Daily., Disp: 30 tablet, Rfl: 6  •  baclofen (LIORESAL) 10 MG tablet, TAKE 1 TABLET BY MOUTH THREE TIMES DAILY, Disp: 90 tablet, Rfl: 2  •  HYDROcodone-acetaminophen (NORCO) 5-325 MG per tablet, Take 1 tablet by mouth Every 6 (Six) Hours As Needed for Moderate Pain ., Disp: 12 tablet, Rfl: 0  •  levETIRAcetam (KEPPRA) 1000 MG tablet, Take 1 tablet by mouth 2 (Two) Times a Day., Disp: 60 tablet, Rfl: 11  •  levothyroxine (SYNTHROID, LEVOTHROID) 25 MCG tablet, Take 1 tablet by mouth Daily., Disp: 90 tablet, Rfl: 0    Allergies:  Penicillins    Family Hx:  Family History   Problem Relation Age of Onset   • Cancer Other    • Diabetes Other    • Heart disease Other    • Hypertension Other         Social History:  Social History     Socioeconomic History   • Marital status: Single     Spouse name: Not on file   • Number of children: Not on file   • Years of education: Not on file   • Highest education level: Not on file   Tobacco Use   • Smoking status: Current Every Day Smoker     Packs/day: 0.50     Years: 30.00     Pack years: 15.00     Types: Cigarettes     Start date: 4/15/1993   • Smokeless tobacco: Never Used   Substance and Sexual Activity   • Alcohol use: No   • Drug use: No       Review of Systems   Constitutional: Negative for activity change, appetite change, chills, fatigue and fever.   HENT: Negative for drooling, ear discharge, ear pain, facial swelling, hearing loss, mouth sores, rhinorrhea and sinus pain.    Eyes: Negative for pain, redness and itching.   Respiratory: Negative for cough, choking, chest tightness, shortness of breath and stridor.    Cardiovascular: Negative for chest pain, palpitations and leg swelling.   Gastrointestinal: Negative for abdominal distention, abdominal pain, anal bleeding, blood in stool, constipation, diarrhea and nausea.   Endocrine: Negative for heat intolerance,  "polydipsia and polyphagia.   Genitourinary: Negative for dysuria, flank pain, frequency and genital sores.   Musculoskeletal: Positive for back pain. Negative for gait problem, joint swelling and myalgias.   Skin: Negative for pallor and rash.   Neurological: Negative for seizures, facial asymmetry, speech difficulty, light-headedness, numbness and headaches.   Hematological: Negative for adenopathy. Does not bruise/bleed easily.   Psychiatric/Behavioral: Negative for confusion, decreased concentration, dysphoric mood and hallucinations. The patient is not nervous/anxious and is not hyperactive.            Objective:     /78   Pulse 114   Temp 97.3 °F (36.3 °C)   Ht 188 cm (74\")   Wt 112 kg (248 lb)   SpO2 95%   BMI 31.84 kg/m²     Physical Exam  Constitutional:       Appearance: He is well-developed.   HENT:      Head: Normocephalic and atraumatic.      Right Ear: External ear normal.      Left Ear: External ear normal.      Nose: Nose normal.   Eyes:      Conjunctiva/sclera: Conjunctivae normal.      Pupils: Pupils are equal, round, and reactive to light.   Cardiovascular:      Rate and Rhythm: Normal rate and regular rhythm.      Heart sounds: Normal heart sounds.   Pulmonary:      Effort: Pulmonary effort is normal.      Breath sounds: Normal breath sounds.   Abdominal:      General: Bowel sounds are normal.      Palpations: Abdomen is soft.   Musculoskeletal:         General: Normal range of motion.      Cervical back: Normal range of motion and neck supple.   Skin:     General: Skin is warm and dry.   Neurological:      Mental Status: He is alert and oriented to person, place, and time.   Psychiatric:         Behavior: Behavior normal.         Thought Content: Thought content normal.         Judgment: Judgment normal.                Assessment/Plan:     Diagnoses and all orders for this visit:    1. DDD (degenerative disc disease), lumbar (Primary)    Patient is asking for pain medication. Patient " will be started on mobic 15mg. Will reevaluate in four weeks.       Follow-up:     4 weeks     Goals:   Goals     • Less pain      Barriers:  none          Barriers to goals:none    Health Maintenance   Topic Date Due   • ANNUAL PHYSICAL  Never done   • ZOSTER VACCINE (1 of 2) Never done   • COVID-19 Vaccine (2 - Pfizer 2-dose series) 04/19/2021   • INFLUENZA VACCINE  08/01/2021   • COLONOSCOPY  01/01/2025   • TDAP/TD VACCINES (2 - Td) 01/28/2026   • HEPATITIS C SCREENING  Completed   • Pneumococcal Vaccine 0-64  Completed       Tobacco: nonsmoker  Alcohol: does not drink  Lifestyle: Patient's Body mass index is 31.84 kg/m². BMI is above normal parameters. Recommendations include: exercise counseling and nutrition counseling.   reduce screen time, reduce fast food intake, family to eat at dinner table more often, keep TV off during meals, eat breakfast and have 3 meals a day    RISK SCORE: 3         Shahla Rodriges M.D. PGY3  [facility]  [address] [place] [address]  Office: [phone]    This document has been electronically signed by Shahla Rodriges MD on April 15, 2021 10:53 CDT          This document has been electronically signed by Shahla Rodriges MD on April 15, 2021 10:53 CDT

## 2021-05-06 ENCOUNTER — TELEPHONE (OUTPATIENT)
Dept: FAMILY MEDICINE CLINIC | Facility: CLINIC | Age: 61
End: 2021-05-06

## 2021-05-06 DIAGNOSIS — G89.29 CHRONIC BILATERAL LOW BACK PAIN WITHOUT SCIATICA: ICD-10-CM

## 2021-05-06 DIAGNOSIS — M54.50 CHRONIC BILATERAL LOW BACK PAIN WITHOUT SCIATICA: ICD-10-CM

## 2021-05-06 RX ORDER — BACLOFEN 10 MG/1
TABLET ORAL
Qty: 90 TABLET | Refills: 2 | Status: SHIPPED | OUTPATIENT
Start: 2021-05-06 | End: 2022-06-23

## 2021-05-06 NOTE — TELEPHONE ENCOUNTER
TRIED TO CALL PATIENT TO MAKE FOLLOW UP APPOINTMENT; SECOND ATTEMPT; LEFT VM X2; MAILING OUT LETTER FOR PATIENT TO CALL AND MAKE AN APPOINTMENT.        THANK YOU,        HENRIQUE

## 2021-12-22 RX ORDER — MELOXICAM 15 MG/1
15 TABLET ORAL DAILY
Qty: 30 TABLET | Refills: 3 | Status: SHIPPED | OUTPATIENT
Start: 2021-12-22 | End: 2022-04-25 | Stop reason: SDUPTHER

## 2022-03-03 DIAGNOSIS — E78.5 DYSLIPIDEMIA: ICD-10-CM

## 2022-03-03 RX ORDER — ATORVASTATIN CALCIUM 80 MG/1
TABLET, FILM COATED ORAL
Qty: 90 TABLET | Refills: 3 | Status: SHIPPED | OUTPATIENT
Start: 2022-03-03 | End: 2022-04-25 | Stop reason: SDUPTHER

## 2022-04-25 ENCOUNTER — OFFICE VISIT (OUTPATIENT)
Dept: FAMILY MEDICINE CLINIC | Facility: CLINIC | Age: 62
End: 2022-04-25

## 2022-04-25 ENCOUNTER — LAB (OUTPATIENT)
Dept: LAB | Facility: HOSPITAL | Age: 62
End: 2022-04-25

## 2022-04-25 VITALS
BODY MASS INDEX: 31.58 KG/M2 | DIASTOLIC BLOOD PRESSURE: 88 MMHG | TEMPERATURE: 96.2 F | OXYGEN SATURATION: 98 % | HEART RATE: 71 BPM | WEIGHT: 246.1 LBS | SYSTOLIC BLOOD PRESSURE: 142 MMHG | HEIGHT: 74 IN

## 2022-04-25 DIAGNOSIS — E78.5 DYSLIPIDEMIA: ICD-10-CM

## 2022-04-25 DIAGNOSIS — E06.3 HASHIMOTO'S THYROIDITIS: ICD-10-CM

## 2022-04-25 DIAGNOSIS — M25.551 RIGHT HIP PAIN: Primary | ICD-10-CM

## 2022-04-25 DIAGNOSIS — I63.9 CEREBROVASCULAR ACCIDENT (CVA), UNSPECIFIED MECHANISM: ICD-10-CM

## 2022-04-25 DIAGNOSIS — G40.909 SEIZURE DISORDER: ICD-10-CM

## 2022-04-25 DIAGNOSIS — E21.0 PRIMARY HYPERPARATHYROIDISM: ICD-10-CM

## 2022-04-25 DIAGNOSIS — M54.50 CHRONIC BILATERAL LOW BACK PAIN WITHOUT SCIATICA: ICD-10-CM

## 2022-04-25 DIAGNOSIS — G89.29 CHRONIC BILATERAL LOW BACK PAIN WITHOUT SCIATICA: ICD-10-CM

## 2022-04-25 PROCEDURE — 36415 COLL VENOUS BLD VENIPUNCTURE: CPT | Performed by: STUDENT IN AN ORGANIZED HEALTH CARE EDUCATION/TRAINING PROGRAM

## 2022-04-25 PROCEDURE — 80053 COMPREHEN METABOLIC PANEL: CPT | Performed by: STUDENT IN AN ORGANIZED HEALTH CARE EDUCATION/TRAINING PROGRAM

## 2022-04-25 PROCEDURE — 84443 ASSAY THYROID STIM HORMONE: CPT | Performed by: STUDENT IN AN ORGANIZED HEALTH CARE EDUCATION/TRAINING PROGRAM

## 2022-04-25 PROCEDURE — 83970 ASSAY OF PARATHORMONE: CPT | Performed by: STUDENT IN AN ORGANIZED HEALTH CARE EDUCATION/TRAINING PROGRAM

## 2022-04-25 PROCEDURE — 99213 OFFICE O/P EST LOW 20 MIN: CPT | Performed by: STUDENT IN AN ORGANIZED HEALTH CARE EDUCATION/TRAINING PROGRAM

## 2022-04-25 PROCEDURE — 84439 ASSAY OF FREE THYROXINE: CPT | Performed by: STUDENT IN AN ORGANIZED HEALTH CARE EDUCATION/TRAINING PROGRAM

## 2022-04-25 RX ORDER — ATORVASTATIN CALCIUM 80 MG/1
80 TABLET, FILM COATED ORAL DAILY
Qty: 90 TABLET | Refills: 3 | Status: SHIPPED | OUTPATIENT
Start: 2022-04-25 | End: 2022-06-23

## 2022-04-25 RX ORDER — LEVETIRACETAM 1000 MG/1
1000 TABLET ORAL DAILY
Qty: 60 TABLET | Refills: 11 | Status: SHIPPED | OUTPATIENT
Start: 2022-04-25 | End: 2022-06-23

## 2022-04-25 RX ORDER — LEVOTHYROXINE SODIUM 0.03 MG/1
25 TABLET ORAL DAILY
Qty: 90 TABLET | Refills: 0 | Status: SHIPPED | OUTPATIENT
Start: 2022-04-25 | End: 2022-06-10

## 2022-04-25 RX ORDER — MELOXICAM 15 MG/1
15 TABLET ORAL DAILY
Qty: 30 TABLET | Refills: 3 | Status: SHIPPED | OUTPATIENT
Start: 2022-04-25 | End: 2022-06-23

## 2022-04-25 RX ORDER — ASPIRIN 81 MG/1
81 TABLET, CHEWABLE ORAL DAILY
Qty: 30 TABLET | Refills: 11 | Status: SHIPPED | OUTPATIENT
Start: 2022-04-25 | End: 2023-03-14 | Stop reason: SDUPTHER

## 2022-04-25 RX ORDER — BACLOFEN 10 MG/1
10 TABLET ORAL 3 TIMES DAILY
Qty: 90 TABLET | Refills: 2 | Status: CANCELLED | OUTPATIENT
Start: 2022-04-25

## 2022-04-25 NOTE — PROGRESS NOTES
"  Family Medicine Residency  Channing Gomez MD    Subjective:     Martin Bender is a 61 y.o. male with a concurrent medical history of seizure disorder, hyperparathyroidism, dyslipidemia, and tobacco dependence and a past medical history of stroke who presents for overdo follow up.     Hyperparathyroidism: Patient has history of over 27 lithotripsies for recurrent renal stones. Elevated parathyroid consistently.  I had spoken with Dr. Reyna of general surgery about this patient previously and he had suggested obtaining an NM parathyroid scan with referral based on results.  Unfortunately, the patient had then had exceptionally poor follow-up and never received a scan.  I put it in for him again today.  Also we will repeat these labs as it has been over a year since he had them.    Hip pain: Chronic issue.  Patient had formally manage this with marijuana at home.  Then said police \"caught me with the joint.  My pot smoking days are over.\" Patient needs XR for hip pain. May have arthritis. Has had steroid shots before. May benefit from physical therapy.  In the meantime, will use Mobic    History of epilepsy: Patient has seen a \"female doctor\" that changed Keppra to 1000mg daily rather than BID. Will update this today. Patient has not had a seizure in 15 years.    History of stroke: Patient takes atorvastatin 80 mg for high cholesterol. Will refill today. Patient has history of stroke 10-12 years ago. Currently takes aspirin daily.  Will refill cholesterol and aspirin medicine.    Poor follow up.  Strongly encourage this patient to begin becoming compliant.  Will see in 3 weeks for physical exam to address care gaps.     Hypothyroidism: Patient currently takes Synthroid.  Is overdue for labs and will obtain TSH and T4 today.      The following portions of the patient's history were reviewed and updated as appropriate: allergies, current medications, past family history, past medical history, past social history, " "past surgical history and problem list.    Past Medical Hx:  Past Medical History:   Diagnosis Date   • Age related cataract     right   • Artificial lens present     in position   • Dyslipidemia    • Encounter for general adult medical examination without abnormal findings    • Gastroesophageal reflux disease    • Hashimoto's thyroiditis    • Headache    • Kidney stone    • Primary hyperparathyroidism (HCC)     possible primary normocalcemic but vit D has to be corrected first   • Secondary hyperparathyroidism (HCC)    • Seizure disorder (HCC)        Past Surgical Hx:  Past Surgical History:   Procedure Laterality Date   • COLONOSCOPY  2015   • CYSTOSCOPY  01/11/2016    Left ureteroscopy with laser lithotripsy.Left retrograde pyelography.Left double J stent insertion, double J stent with no tether. [facility]. Per patient had stone removal \"25 times\"       Current Meds:    Current Outpatient Medications:   •  aspirin 81 MG chewable tablet, Chew 1 tablet Daily., Disp: 30 tablet, Rfl: 11  •  atorvastatin (LIPITOR) 80 MG tablet, Take 1 tablet by mouth Daily., Disp: 90 tablet, Rfl: 3  •  baclofen (LIORESAL) 10 MG tablet, TAKE 1 TABLET BY MOUTH THREE TIMES DAILY, Disp: 90 tablet, Rfl: 2  •  levETIRAcetam (KEPPRA) 1000 MG tablet, Take 1 tablet by mouth Daily., Disp: 60 tablet, Rfl: 11  •  levothyroxine (SYNTHROID, LEVOTHROID) 25 MCG tablet, Take 1 tablet by mouth Daily., Disp: 90 tablet, Rfl: 0  •  meloxicam (Mobic) 15 MG tablet, Take 1 tablet by mouth Daily., Disp: 30 tablet, Rfl: 3    Allergies:  Allergies   Allergen Reactions   • Penicillins Rash       Family Hx:  Family History   Problem Relation Age of Onset   • Cancer Other    • Diabetes Other    • Heart disease Other    • Hypertension Other         Social History:  Social History     Socioeconomic History   • Marital status: Single   Tobacco Use   • Smoking status: Current Every Day Smoker     Packs/day: 0.50     Years: 30.00     Pack years: " "15.00     Types: Cigarettes     Start date: 4/15/1993   • Smokeless tobacco: Never Used   Substance and Sexual Activity   • Alcohol use: No   • Drug use: No       Review of Systems  Review of Systems   Constitutional: Positive for fatigue.   Gastrointestinal: Negative for nausea and vomiting.   Genitourinary: Negative for difficulty urinating and dysuria.   Musculoskeletal: Positive for arthralgias (hip pain ).   Neurological: Negative for dizziness, syncope, weakness, light-headedness and headaches.       Objective:     /88   Pulse 71   Temp 96.2 °F (35.7 °C)   Ht 188 cm (74\")   Wt 112 kg (246 lb 1.6 oz)   SpO2 98%   BMI 31.60 kg/m²   Physical Exam  Constitutional:       General: He is not in acute distress.     Appearance: Normal appearance. He is obese. He is not ill-appearing, toxic-appearing or diaphoretic.   Neurological:      Mental Status: He is alert.          Assessment/Plan:     Diagnoses and all orders for this visit:    1. Right hip pain (Primary)  Chronic issue.  Patient had formally manage this with marijuana at home.  Then said police \"caught me with the joint.  My pot smoking days are over.\" Patient needs XR for hip pain. May have arthritis. Has had steroid shots before. May benefit from physical therapy.  In the meantime, will use Mobic    -     meloxicam (Mobic) 15 MG tablet; Take 1 tablet by mouth Daily.  Dispense: 30 tablet; Refill: 3  -     XR Hip With or Without Pelvis 2 - 3 View Right; Future    2. Hashimoto's thyroiditis  Patient currently takes Synthroid.  Is overdue for labs and will obtain TSH and T4 today.    -     levothyroxine (SYNTHROID, LEVOTHROID) 25 MCG tablet; Take 1 tablet by mouth Daily.  Dispense: 90 tablet; Refill: 0  -     TSH+Free T4    3. Seizure disorder (HCC)  Patient has seen a \"female doctor\" that changed Keppra to 1000mg daily rather than BID. Will update this today. Patient has not had a seizure in 15 years.  Refill this medicine today.    -     " levETIRAcetam (KEPPRA) 1000 MG tablet; Take 1 tablet by mouth Daily.  Dispense: 60 tablet; Refill: 11      4. Dyslipidemia  Patient has history of stroke, discussed elsewhere.  Refill of atorvastatin is appropriate.  -     atorvastatin (LIPITOR) 80 MG tablet; Take 1 tablet by mouth Daily.  Dispense: 90 tablet; Refill: 3    5. Cerebrovascular accident (CVA), unspecified mechanism (HCC)   Patient takes atorvastatin 80 mg for high cholesterol. Will refill today. Patient has history of stroke 10-12 years ago. Currently takes aspirin daily.  Will refill cholesterol and aspirin medicine.  -     aspirin 81 MG chewable tablet; Chew 1 tablet Daily.  Dispense: 30 tablet; Refill: 11    6. Primary hyperparathyroidism (HCC)   Patient has history of over 27 lithotripsies for recurrent renal stones. Elevated parathyroid consistently.  I had spoken with Dr. Reyna of general surgery about this patient previously and he had suggested obtaining an NM parathyroid scan with referral based on results.  Unfortunately, the patient had then had exceptionally poor follow-up and never received a scan.  I put it in for him again today.  Also we will repeat these labs as it has been over a year since he had them.    -     Comprehensive metabolic panel  -     PTH, Intact & Calcium  -     NM parathyroid scan; Future      7.  Care gaps  Poor follow up.  Strongly encourage this patient to begin becoming compliant.  Will see in 3 weeks for physical exam to address care gaps and go over labs.  We will make sure that he is seen only by me.            · Rx changes: None       Follow-up:     Return in about 3 weeks (around 5/16/2022) for Annual PCP ONLY.    Preventative:  Health Maintenance   Topic Date Due   • ANNUAL PHYSICAL  Never done   • ZOSTER VACCINE (1 of 2) Never done   • Pneumococcal Vaccine 0-64 (2 - PCV) 01/28/2017   • COVID-19 Vaccine (3 - Booster for Pfizer series) 09/26/2021   • INFLUENZA VACCINE  08/01/2022   • COLORECTAL CANCER SCREENING   01/01/2025   • TDAP/TD VACCINES (2 - Td or Tdap) 01/28/2026   • HEPATITIS C SCREENING  Completed       Alcohol use:  reports no history of alcohol use.  Nicotine status  reports that he has been smoking cigarettes. He started smoking about 29 years ago. He has a 15.00 pack-year smoking history. He has never used smokeless tobacco.     Goals     • Less pain      Barriers:  none              RISK SCORE: 4      This document has been electronically signed by Channing Gomez MD on April 25, 2022 14:47 CDT

## 2022-04-26 LAB
ALBUMIN SERPL-MCNC: 4.3 G/DL (ref 3.5–5.2)
ALBUMIN/GLOB SERPL: 1.4 G/DL
ALP SERPL-CCNC: 106 U/L (ref 39–117)
ALT SERPL W P-5'-P-CCNC: 22 U/L (ref 1–41)
ANION GAP SERPL CALCULATED.3IONS-SCNC: 13.3 MMOL/L (ref 5–15)
AST SERPL-CCNC: 15 U/L (ref 1–40)
BILIRUB SERPL-MCNC: 0.4 MG/DL (ref 0–1.2)
BUN SERPL-MCNC: 12 MG/DL (ref 8–23)
BUN/CREAT SERPL: 12.2 (ref 7–25)
CALCIUM SPEC-SCNC: 9.1 MG/DL (ref 8.6–10.5)
CALCIUM SPEC-SCNC: 9.1 MG/DL (ref 8.6–10.5)
CHLORIDE SERPL-SCNC: 104 MMOL/L (ref 98–107)
CO2 SERPL-SCNC: 23.7 MMOL/L (ref 22–29)
CREAT SERPL-MCNC: 0.98 MG/DL (ref 0.76–1.27)
EGFRCR SERPLBLD CKD-EPI 2021: 88.3 ML/MIN/1.73
GLOBULIN UR ELPH-MCNC: 3.1 GM/DL
GLUCOSE SERPL-MCNC: 112 MG/DL (ref 65–99)
POTASSIUM SERPL-SCNC: 4.1 MMOL/L (ref 3.5–5.2)
PROT SERPL-MCNC: 7.4 G/DL (ref 6–8.5)
PTH-INTACT SERPL-MCNC: 71 PG/ML (ref 15–65)
SODIUM SERPL-SCNC: 141 MMOL/L (ref 136–145)
T4 FREE SERPL-MCNC: 1.01 NG/DL (ref 0.93–1.7)
TSH SERPL DL<=0.05 MIU/L-ACNC: 3.17 UIU/ML (ref 0.27–4.2)

## 2022-04-26 NOTE — PROGRESS NOTES
I have seen this patient and discussed the case with the resident and agree with the assessment and plan.  NOE Langley M.D.

## 2022-05-13 ENCOUNTER — APPOINTMENT (OUTPATIENT)
Dept: NUCLEAR MEDICINE | Facility: HOSPITAL | Age: 62
End: 2022-05-13

## 2022-05-17 ENCOUNTER — HOSPITAL ENCOUNTER (OUTPATIENT)
Dept: NUCLEAR MEDICINE | Facility: HOSPITAL | Age: 62
Discharge: HOME | End: 2022-05-17

## 2022-05-17 DIAGNOSIS — E21.0 PRIMARY HYPERPARATHYROIDISM: ICD-10-CM

## 2022-05-17 PROCEDURE — 78070 PARATHYROID PLANAR IMAGING: CPT

## 2022-05-17 PROCEDURE — A9500 TC99M SESTAMIBI: HCPCS | Performed by: STUDENT IN AN ORGANIZED HEALTH CARE EDUCATION/TRAINING PROGRAM

## 2022-05-17 PROCEDURE — 0 TECHNETIUM SESTAMIBI: Performed by: STUDENT IN AN ORGANIZED HEALTH CARE EDUCATION/TRAINING PROGRAM

## 2022-05-17 RX ADMIN — TECHNETIUM TC 99M SESTAMIBI 1 DOSE: 1 INJECTION INTRAVENOUS at 12:08

## 2022-05-24 ENCOUNTER — OFFICE VISIT (OUTPATIENT)
Dept: FAMILY MEDICINE CLINIC | Facility: CLINIC | Age: 62
End: 2022-05-24

## 2022-05-24 ENCOUNTER — LAB (OUTPATIENT)
Dept: LAB | Facility: HOSPITAL | Age: 62
End: 2022-05-24

## 2022-05-24 VITALS
BODY MASS INDEX: 32.19 KG/M2 | HEART RATE: 110 BPM | DIASTOLIC BLOOD PRESSURE: 90 MMHG | HEIGHT: 74 IN | OXYGEN SATURATION: 96 % | WEIGHT: 250.8 LBS | SYSTOLIC BLOOD PRESSURE: 152 MMHG

## 2022-05-24 DIAGNOSIS — M87.00 AVASCULAR NECROSIS: ICD-10-CM

## 2022-05-24 DIAGNOSIS — E21.0 PRIMARY HYPERPARATHYROIDISM: ICD-10-CM

## 2022-05-24 DIAGNOSIS — E21.3 HYPERPARATHYROIDISM: Primary | ICD-10-CM

## 2022-05-24 DIAGNOSIS — E21.3 HYPERPARATHYROIDISM: ICD-10-CM

## 2022-05-24 DIAGNOSIS — Z12.2 SCREENING FOR LUNG CANCER: ICD-10-CM

## 2022-05-24 LAB
25(OH)D3 SERPL-MCNC: 10.4 NG/ML (ref 30–100)
ALBUMIN SERPL-MCNC: 4.1 G/DL (ref 3.5–5.2)
ALBUMIN/GLOB SERPL: 1.1 G/DL
ALP SERPL-CCNC: 101 U/L (ref 39–117)
ALT SERPL W P-5'-P-CCNC: 20 U/L (ref 1–41)
ANION GAP SERPL CALCULATED.3IONS-SCNC: 12.6 MMOL/L (ref 5–15)
AST SERPL-CCNC: 17 U/L (ref 1–40)
BASOPHILS # BLD AUTO: 0.07 10*3/MM3 (ref 0–0.2)
BASOPHILS NFR BLD AUTO: 1 % (ref 0–1.5)
BILIRUB SERPL-MCNC: 0.6 MG/DL (ref 0–1.2)
BUN SERPL-MCNC: 12 MG/DL (ref 8–23)
BUN/CREAT SERPL: 14 (ref 7–25)
CALCIUM SPEC-SCNC: 9.4 MG/DL (ref 8.6–10.5)
CALCIUM SPEC-SCNC: 9.9 MG/DL (ref 8.6–10.5)
CHLORIDE SERPL-SCNC: 102 MMOL/L (ref 98–107)
CO2 SERPL-SCNC: 25.4 MMOL/L (ref 22–29)
CREAT SERPL-MCNC: 0.86 MG/DL (ref 0.76–1.27)
DEPRECATED RDW RBC AUTO: 43.7 FL (ref 37–54)
EGFRCR SERPLBLD CKD-EPI 2021: 99.1 ML/MIN/1.73
EOSINOPHIL # BLD AUTO: 0.22 10*3/MM3 (ref 0–0.4)
EOSINOPHIL NFR BLD AUTO: 3 % (ref 0.3–6.2)
ERYTHROCYTE [DISTWIDTH] IN BLOOD BY AUTOMATED COUNT: 13.3 % (ref 12.3–15.4)
GLOBULIN UR ELPH-MCNC: 3.9 GM/DL
GLUCOSE SERPL-MCNC: 117 MG/DL (ref 65–99)
HCT VFR BLD AUTO: 47.4 % (ref 37.5–51)
HGB BLD-MCNC: 16.9 G/DL (ref 13–17.7)
IMM GRANULOCYTES # BLD AUTO: 0.02 10*3/MM3 (ref 0–0.05)
IMM GRANULOCYTES NFR BLD AUTO: 0.3 % (ref 0–0.5)
LYMPHOCYTES # BLD AUTO: 2.25 10*3/MM3 (ref 0.7–3.1)
LYMPHOCYTES NFR BLD AUTO: 31.1 % (ref 19.6–45.3)
MCH RBC QN AUTO: 32.5 PG (ref 26.6–33)
MCHC RBC AUTO-ENTMCNC: 35.7 G/DL (ref 31.5–35.7)
MCV RBC AUTO: 91.2 FL (ref 79–97)
MONOCYTES # BLD AUTO: 0.63 10*3/MM3 (ref 0.1–0.9)
MONOCYTES NFR BLD AUTO: 8.7 % (ref 5–12)
NEUTROPHILS NFR BLD AUTO: 4.05 10*3/MM3 (ref 1.7–7)
NEUTROPHILS NFR BLD AUTO: 55.9 % (ref 42.7–76)
NRBC BLD AUTO-RTO: 0 /100 WBC (ref 0–0.2)
PHOSPHATE SERPL-MCNC: 2.8 MG/DL (ref 2.5–4.5)
PLATELET # BLD AUTO: 293 10*3/MM3 (ref 140–450)
PMV BLD AUTO: 10.7 FL (ref 6–12)
POTASSIUM SERPL-SCNC: 3.9 MMOL/L (ref 3.5–5.2)
PROT SERPL-MCNC: 8 G/DL (ref 6–8.5)
PTH-INTACT SERPL-MCNC: 46.3 PG/ML (ref 15–65)
RBC # BLD AUTO: 5.2 10*6/MM3 (ref 4.14–5.8)
SODIUM SERPL-SCNC: 140 MMOL/L (ref 136–145)
WBC NRBC COR # BLD: 7.24 10*3/MM3 (ref 3.4–10.8)

## 2022-05-24 PROCEDURE — 83970 ASSAY OF PARATHORMONE: CPT

## 2022-05-24 PROCEDURE — 82397 CHEMILUMINESCENT ASSAY: CPT

## 2022-05-24 PROCEDURE — 80053 COMPREHEN METABOLIC PANEL: CPT

## 2022-05-24 PROCEDURE — 36415 COLL VENOUS BLD VENIPUNCTURE: CPT

## 2022-05-24 PROCEDURE — 99213 OFFICE O/P EST LOW 20 MIN: CPT | Performed by: STUDENT IN AN ORGANIZED HEALTH CARE EDUCATION/TRAINING PROGRAM

## 2022-05-24 PROCEDURE — 85025 COMPLETE CBC W/AUTO DIFF WBC: CPT

## 2022-05-24 PROCEDURE — 82306 VITAMIN D 25 HYDROXY: CPT

## 2022-05-24 PROCEDURE — 84100 ASSAY OF PHOSPHORUS: CPT

## 2022-05-24 NOTE — PROGRESS NOTES
"  Family Medicine Residency  Channing Gomez MD    Subjective:     Martin Bender is a 61 y.o. male with a concurrent medical history of seizure disorder, hypothyroidism,  hyperparathyroidism, dyslipidemia, and tobacco dependence and a past medical history of stroke who presents for follow-up for hyperparathyroidism.    Patient has had noncompliance issues in the past and had missed several appointments in the past.  I had tried to follow him up in February 2021 and had little success.  He finally came to see me last month for an overdue follow-up.  Although address several care gaps, most concerning is his history of repeated elevated parathyroid labs in the context of having a history of \"over 27 lithotripsies\" for recurrent renal stones.  I had spoken with Dr. Reyna of general surgery before about this patient and he suggested obtaining a parathyroid scan with referral for surgery based on results.  The patient finally obtained the scan and it did not show any pathology.    Further work-up will need to be done now as the patient's labs and clinical presentation do not seem to fit a well-defined profile. He has had elevated parathyroid levels and symptoms with bone pain in the setting of his hip and renal stones.  He will need further work-up. We will repeat parathyroid labs today but will also add on CBC, phosphorus, vitamin D, parathyroid related peptide level, and will send him to endocrinology as well. Patient has smoked greater than a pack of cigarettes a day for 40 years.  With hyperparathyroid-like symptoms and potential for malignant process, will order chest x-ray today and low-dose CT screening of the chest to look for lung cancer. We will also obtain Bence-Laguerre protein though renal function currently appears stable.    Patient has history of avascular necrosis and is having hip pain.  We will send back to orthopedic surgery today at his request.    The following portions of the patient's history were " "reviewed and updated as appropriate: allergies, current medications, past family history, past medical history, past social history, past surgical history and problem list.    Past Medical Hx:  Past Medical History:   Diagnosis Date   • Age related cataract     right   • Artificial lens present     in position   • Dyslipidemia    • Encounter for general adult medical examination without abnormal findings    • Gastroesophageal reflux disease    • Hashimoto's thyroiditis    • Headache    • Kidney stone    • Primary hyperparathyroidism (HCC)     possible primary normocalcemic but vit D has to be corrected first   • Secondary hyperparathyroidism (HCC)    • Seizure disorder (HCC)        Past Surgical Hx:  Past Surgical History:   Procedure Laterality Date   • COLONOSCOPY  2015   • CYSTOSCOPY  01/11/2016    Left ureteroscopy with laser lithotripsy.Left retrograde pyelography.Left double J stent insertion, double J stent with no tether. [facility]. Per patient had stone removal \"25 times\"       Current Meds:    Current Outpatient Medications:   •  aspirin 81 MG chewable tablet, Chew 1 tablet Daily., Disp: 30 tablet, Rfl: 11  •  atorvastatin (LIPITOR) 80 MG tablet, Take 1 tablet by mouth Daily., Disp: 90 tablet, Rfl: 3  •  baclofen (LIORESAL) 10 MG tablet, TAKE 1 TABLET BY MOUTH THREE TIMES DAILY, Disp: 90 tablet, Rfl: 2  •  levETIRAcetam (KEPPRA) 1000 MG tablet, Take 1 tablet by mouth Daily., Disp: 60 tablet, Rfl: 11  •  levothyroxine (SYNTHROID, LEVOTHROID) 25 MCG tablet, Take 1 tablet by mouth Daily., Disp: 90 tablet, Rfl: 0  •  meloxicam (Mobic) 15 MG tablet, Take 1 tablet by mouth Daily., Disp: 30 tablet, Rfl: 3    Allergies:  Allergies   Allergen Reactions   • Penicillins Rash       Family Hx:  Family History   Problem Relation Age of Onset   • Cancer Other    • Diabetes Other    • Heart disease Other    • Hypertension Other         Social History:  Social History     Socioeconomic History   • " "Marital status: Single   Tobacco Use   • Smoking status: Current Every Day Smoker     Packs/day: 0.50     Years: 30.00     Pack years: 15.00     Types: Cigarettes     Start date: 4/15/1993   • Smokeless tobacco: Never Used   Substance and Sexual Activity   • Alcohol use: No   • Drug use: No       Review of Systems  Review of Systems   Constitutional: Positive for fatigue. Negative for activity change, appetite change, chills, diaphoresis, fever and unexpected weight change.   Respiratory: Negative for cough, chest tightness, shortness of breath and wheezing.    Gastrointestinal: Negative for abdominal pain, blood in stool, nausea and vomiting.   Genitourinary: Negative for difficulty urinating and dysuria.   Musculoskeletal: Negative for back pain, joint swelling and neck pain.        +right hip pain        Objective:     /90   Pulse 110   Ht 188 cm (74\")   Wt 114 kg (250 lb 12.8 oz)   SpO2 96%   BMI 32.20 kg/m²   Physical Exam  Constitutional:       General: He is not in acute distress.     Appearance: Normal appearance. He is obese. He is not ill-appearing, toxic-appearing or diaphoretic.   HENT:      Head: Normocephalic and atraumatic.   Pulmonary:      Effort: Pulmonary effort is normal. No respiratory distress.   Musculoskeletal:      Cervical back: Normal range of motion.   Neurological:      Mental Status: He is alert.      Gait: Gait abnormal (limps).          Assessment/Plan:     Diagnoses and all orders for this visit:    1. Hyperparathyroidism (HCC) (Primary)  Patient has had noncompliance issues in the past and had missed several appointments in the past.  I had tried to follow him up in February 2021 and had little success.  He finally came to see me last month for an overdue follow-up.  Although address several care gaps, most concerning is his history of repeated elevated parathyroid labs in the context of having a history of \"over 27 lithotripsies\" for recurrent renal stones.  I had spoken " with Dr. Reyna of general surgery before about this patient and he suggested obtaining a parathyroid scan with referral for surgery based on results.  The patient finally obtained the scan and it did not show any pathology.    Further work-up will need to be done now as the patient's labs and clinical presentation do not seem to fit a well-defined profile. He has had elevated parathyroid levels and symptoms with bone pain in the setting of his hip and renal stones.  He will need further work-up. We will repeat parathyroid labs today but will also add on CBC, phosphorus, vitamin D, parathyroid related peptide level, and will send him to endocrinology as well. Patient has smoked greater than a pack of cigarettes a day for 40 years.  With hyperparathyroid-like symptoms and potential for malignant process, will order chest x-ray today and low-dose CT screening of the chest to look for lung cancer. We will also obtain Bence-Laguerre protein though renal function currently appears stable.        -     PTH-related Peptide; Future  -     PTH, Intact & Calcium; Future  -     Comprehensive metabolic panel; Future  -     Phosphorus; Future  -     CBC w AUTO Differential; Future  -     Ambulatory Referral to Endocrinology  -     Vitamin D 25 hydroxy; Future  -     Bence Laguerre Protein, Urine, Random - Urine, Clean Catch; Future    2. Screening for lung cancer  See above.   -     XR Chest 1 View; Future  -     CT Chest Low Dose Wo; Future    4. Avascular necrosis (HCC)  Patient has history of avascular necrosis and is having hip pain.  We will send back to orthopedic surgery today at his request.  -     Ambulatory Referral to Orthopedic Surgery                 Follow-up:     Return in about 3 weeks (around 6/14/2022) for Recheck, Annual.    Preventative:  Health Maintenance   Topic Date Due   • ANNUAL PHYSICAL  Never done   • ZOSTER VACCINE (1 of 2) Never done   • Pneumococcal Vaccine 0-64 (2 - PCV) 01/28/2017   • COVID-19 Vaccine (3  - Booster for Pfizer series) 09/26/2021   • INFLUENZA VACCINE  08/01/2022   • COLORECTAL CANCER SCREENING  01/01/2025   • TDAP/TD VACCINES (2 - Td or Tdap) 01/28/2026   • HEPATITIS C SCREENING  Completed       Alcohol use:  reports no history of alcohol use.  Nicotine status  reports that he has been smoking cigarettes. He started smoking about 29 years ago. He has a 15.00 pack-year smoking history. He has never used smokeless tobacco.     Goals     • Less pain      Barriers:  none              RISK SCORE: 4      This document has been electronically signed by Channing Gomez MD on May 24, 2022 13:34 CDT

## 2022-05-26 NOTE — PROGRESS NOTES
I have seen the patient.  I have reviewed the notes, assessments, and/or procedures performed by Channing Gomez MD, I concur with her/his documentation and assessment and plan for Martin Bender.               This document has been electronically signed by Gerson Lynch MD on May 26, 2022 10:27 CDT

## 2022-06-02 ENCOUNTER — APPOINTMENT (OUTPATIENT)
Dept: CT IMAGING | Facility: HOSPITAL | Age: 62
End: 2022-06-02

## 2022-06-02 LAB — PTH RELATED PROT SERPL-SCNC: <2 PMOL/L

## 2022-06-10 DIAGNOSIS — E06.3 HASHIMOTO'S THYROIDITIS: ICD-10-CM

## 2022-06-10 RX ORDER — LEVOTHYROXINE SODIUM 0.03 MG/1
25 TABLET ORAL DAILY
Qty: 90 TABLET | Refills: 0 | Status: SHIPPED | OUTPATIENT
Start: 2022-06-10 | End: 2023-03-14 | Stop reason: SDUPTHER

## 2022-06-16 ENCOUNTER — TELEMEDICINE (OUTPATIENT)
Dept: ENDOCRINOLOGY | Facility: CLINIC | Age: 62
End: 2022-06-16

## 2022-06-16 DIAGNOSIS — E21.1 SECONDARY HYPERPARATHYROIDISM, NON-RENAL: Primary | ICD-10-CM

## 2022-06-16 PROCEDURE — 99204 OFFICE O/P NEW MOD 45 MIN: CPT | Performed by: INTERNAL MEDICINE

## 2022-06-18 ENCOUNTER — TELEPHONE (OUTPATIENT)
Dept: GENERAL RADIOLOGY | Facility: HOSPITAL | Age: 62
End: 2022-06-18

## 2022-06-23 DIAGNOSIS — M25.551 RIGHT HIP PAIN: ICD-10-CM

## 2022-06-23 DIAGNOSIS — M54.50 CHRONIC BILATERAL LOW BACK PAIN WITHOUT SCIATICA: ICD-10-CM

## 2022-06-23 DIAGNOSIS — G89.29 CHRONIC BILATERAL LOW BACK PAIN WITHOUT SCIATICA: ICD-10-CM

## 2022-06-23 DIAGNOSIS — G40.909 SEIZURE DISORDER: ICD-10-CM

## 2022-06-23 DIAGNOSIS — E78.5 DYSLIPIDEMIA: ICD-10-CM

## 2022-06-23 RX ORDER — LEVETIRACETAM 1000 MG/1
TABLET ORAL
Qty: 14 TABLET | Refills: 0 | Status: SHIPPED | OUTPATIENT
Start: 2022-06-23 | End: 2022-09-13

## 2022-06-23 RX ORDER — ATORVASTATIN CALCIUM 80 MG/1
TABLET, FILM COATED ORAL
Qty: 90 TABLET | Refills: 2 | Status: SHIPPED | OUTPATIENT
Start: 2022-06-23 | End: 2023-03-14 | Stop reason: SDUPTHER

## 2022-06-23 RX ORDER — MELOXICAM 15 MG/1
TABLET ORAL
Qty: 30 TABLET | Refills: 1 | Status: SHIPPED | OUTPATIENT
Start: 2022-06-23 | End: 2022-09-13

## 2022-06-23 RX ORDER — BACLOFEN 10 MG/1
TABLET ORAL
Qty: 90 TABLET | Refills: 0 | Status: SHIPPED | OUTPATIENT
Start: 2022-06-23 | End: 2022-09-13

## 2022-06-23 RX ORDER — LEVETIRACETAM 1000 MG/1
TABLET ORAL
Qty: 60 TABLET | Refills: 10 | OUTPATIENT
Start: 2022-06-23

## 2022-06-27 NOTE — PROGRESS NOTES
"     Family Medicine Residency   Monica Wilkinson MD    Martin Bender is a 58 y.o. male who presents to family medicine residency clinic for the following:    PROBLEM LIST:  1. Cerebrovascular Accident  2. GERD  3. Hashimoto's thyroiditis  4. Primary Hyperparathyroidism  5. Seizure Disorder  6. Bilateral Hip Pain with avascular necrosis of right hip  7. Renal Calculi  8. Dyslipidemia  9. Tobacco Dependence    Hip Pain  He is currently being evaluated by orthopedics. He saw Dr. Suarez in September. He recommended an MRI lumbar spine for further evaluation. He never scheduled the MRI. I recommended calling and scheduling. He has had a lot going on in his personal life that he has been distracted by.     Hashimoto's   We ordered labs at the last visit. He did not go get them done. He said he could go today and get his labs drawn, and I will call him with results.         Past Medical Hx:   Past Medical History:   Diagnosis Date   • Age related cataract     right   • Artificial lens present     in position   • Dyslipidemia    • Encounter for general adult medical examination without abnormal findings    • Gastroesophageal reflux disease    • Hashimoto's thyroiditis    • Headache    • Kidney stone    • Primary hyperparathyroidism (CMS/HCC)     possible primary normocalcemic but vit D has to be corrected first   • Secondary hyperparathyroidism (CMS/HCC)    • Seizure disorder (CMS/HCC)        Past Surgical Hx:  Past Surgical History:   Procedure Laterality Date   • COLONOSCOPY  2015   • CYSTOSCOPY  01/11/2016    Left ureteroscopy with laser lithotripsy.Left retrograde pyelography.Left double J stent insertion, double J stent with no tether. [facility]. Per patient had stone removal \"25 times\"       Current Meds:    Current Outpatient Medications:   •  aspirin 81 MG chewable tablet, Chew 1 tablet Daily., Disp: 30 tablet, Rfl: 11  •  atorvastatin (LIPITOR) 40 MG tablet, Take 1 tablet by mouth " Daily., Disp: 30 tablet, Rfl: 3  •  baclofen (LIORESAL) 10 MG tablet, Take 1 tablet by mouth 3 (Three) Times a Day., Disp: 90 tablet, Rfl: 3  •  levETIRAcetam (KEPPRA) 1000 MG tablet, TAKE 1 TABLET BY MOUTH EVERY 12 (TWELVE) HOURS., Disp: 60 tablet, Rfl: 3  •  levothyroxine (SYNTHROID, LEVOTHROID) 25 MCG tablet, Take 1 tablet by mouth Daily., Disp: 30 tablet, Rfl: 3  •  raNITIdine (ZANTAC) 150 MG tablet, Take 1 tablet by mouth Every Night., Disp: 60 tablet, Rfl: 11    Allergies:  Penicillins    Family Hx:  Family History   Problem Relation Age of Onset   • Cancer Other    • Diabetes Other    • Heart disease Other    • Hypertension Other         Social History:  Social History     Socioeconomic History   • Marital status:      Spouse name: Not on file   • Number of children: Not on file   • Years of education: Not on file   • Highest education level: Not on file   Social Needs   • Financial resource strain: Not on file   • Food insecurity - worry: Not on file   • Food insecurity - inability: Not on file   • Transportation needs - medical: Not on file   • Transportation needs - non-medical: Not on file   Occupational History   • Not on file   Tobacco Use   • Smoking status: Current Every Day Smoker     Packs/day: 0.50     Years: 30.00     Pack years: 15.00     Types: Cigarettes   • Smokeless tobacco: Never Used   Substance and Sexual Activity   • Alcohol use: No   • Drug use: No   • Sexual activity: Not on file   Other Topics Concern   • Not on file   Social History Narrative   • Not on file       Review of Systems  Review of Systems   Constitutional: Negative for chills, diaphoresis and fever.   HENT: Negative for sneezing and sore throat.    Eyes: Negative for pain and discharge.   Respiratory: Negative for cough and shortness of breath.    Gastrointestinal: Negative for constipation, diarrhea, nausea and vomiting.   Endocrine: Negative for cold intolerance and heat intolerance.   Genitourinary: Negative for  difficulty urinating, dysuria, frequency and urgency.   Musculoskeletal: Negative for arthralgias and myalgias.        Hip Pain   Skin: Negative for color change and pallor.   Allergic/Immunologic: Negative for environmental allergies and food allergies.   Neurological: Negative for dizziness, syncope and weakness.   Psychiatric/Behavioral: Negative for confusion and sleep disturbance.       Physical Exam:  Vitals:    11/26/18 0853   BP: 130/80   Pulse: (!) 124   SpO2: 93%       Body mass index is 31.07 kg/m².   Physical Exam   Constitutional: He is oriented to person, place, and time. He appears well-developed and well-nourished. No distress.   HENT:   Head: Normocephalic and atraumatic.   Nose: Nose normal.   Eyes: Conjunctivae and EOM are normal.   Neck: Normal range of motion. Neck supple.   Cardiovascular: Normal rate, regular rhythm and normal heart sounds.   No murmur heard.  Pulmonary/Chest: Effort normal and breath sounds normal. No respiratory distress. He has no wheezes. He has no rales.   Abdominal: Soft. Bowel sounds are normal. He exhibits no distension. There is no tenderness. There is no guarding.   Musculoskeletal: Normal range of motion.   Pain in hips   Neurological: He is alert and oriented to person, place, and time.   Skin: Skin is warm and dry.   Psychiatric: He has a normal mood and affect. His behavior is normal.   Vitals reviewed.        Data Reviewed:     LABS:   Lab Results   Component Value Date    GLUCOSE 88 02/21/2018    BUN 12 02/21/2018    CREATININE 0.86 02/21/2018    EGFRIFNONA 92 02/21/2018    BCR 14.0 02/21/2018    K 4.0 02/21/2018    CO2 25.0 02/21/2018    CALCIUM 9.5 02/21/2018    ALBUMIN 4.30 02/21/2018    AST 33 02/21/2018    ALT 49 02/21/2018     Lab Results   Component Value Date    WBC 8.31 07/11/2017    HGB 16.4 07/11/2017    HCT 47.4 07/11/2017    MCV 95.4 07/11/2017     07/11/2017     Lab Results   Component Value Date    CHOL 230 (H) 07/11/2017    TRIG 509 (H)  07/11/2017    HDL 29 (L) 07/11/2017     (H) 07/11/2017     Lab Results   Component Value Date    TSH 1.910 02/21/2018     No results found for: HGBA1C  Lab Results   Component Value Date    CREATININE 0.86 02/21/2018       Assessment/Plan     1. Hip Pain- avascular necrosis of bone of right hip   -He is established with orthopedics  -Recommended scheduling an MRI for further evaluation. I told him to call and get that scheduled.     2. History of Hashimoto's and Primary Hyperparathyroidism  -Will check labs today (he did not go to the lab last visit)  -I will call patient with results.     3. Weight:  -Class: Obese Class I: 30-34.9kg/m2  -Patient's Body mass index is 31.07 kg/m². BMI is above normal parameters. Recommendations include: nutrition counseling.      4. Nicotine status:  reports that he has been smoking cigarettes.  He has a 15.00 pack-year smoking history. he has never used smokeless tobacco.   I advised Martin of the risks of continuing to use tobacco, and I provided him with tobacco cessation educational materials in the After Visit Summary.     During this visit, I spent 5 minutes counseling the patient regarding tobacco cessation.    5. Alcohol use:  reports that he does not drink alcohol.    6. Health Maintenance:   Health Maintenance   Topic Date Due   • ANNUAL PHYSICAL  10/01/1963   • ZOSTER VACCINE (1 of 2) 10/01/2011   • INFLUENZA VACCINE  08/01/2018   • COLONOSCOPY  01/01/2025   • TDAP/TD VACCINES (2 - Td) 01/28/2026   • PNEUMOCOCCAL VACCINE (19-64 MEDIUM RISK)  Completed   • HEPATITIS C SCREENING  Completed       FOLLOW-UP  Return in about 3 months (around 2/26/2019) for Recheck.      ORDERS  There are no discontinued medications.  Martin was seen today for cough and nasal congestion.    Diagnoses and all orders for this visit:    Bilateral hip pain    DDD (degenerative disc disease), lumbar    Hashimoto's thyroiditis      Goals     • Less pain      Barriers:  none          Risk  Quality 431: Preventive Care And Screening: Unhealthy Alcohol Use - Screening: Patient not identified as an unhealthy alcohol user when screened for unhealthy alcohol use using a systematic screening method Score: 3        This document has been electronically signed by Monica Wilkinson MD on November 26, 2018 9:28 AM         Quality 226: Preventive Care And Screening: Tobacco Use: Screening And Cessation Intervention: Patient screened for tobacco use and is an ex/non-smoker Quality 110: Preventive Care And Screening: Influenza Immunization: Influenza Immunization Ordered or Recommended, but not Administered due to system reason Quality 130: Documentation Of Current Medications In The Medical Record: Current Medications Documented Detail Level: Detailed

## 2022-06-28 NOTE — PROGRESS NOTES
CC   Secondary hyperparathyroidism                                      This was a Telehealth Encounter. Benefits and Disadvantages of a Telehealth Visit were discussed and accepted by patient. .  Patient agreed to receive service through Telehealth visit as patient is being compliant with social distancing recommendations imparted by CDC.     You have chosen to receive care through a telehealth visit.  Do you consent to use a video/audio connection for your medical care today? Yes          History of Present Illness    61 y.o. male referred for elevated PTH in the setting of vitamin D Deficiency.    He has h o nephrolithiasis but none over the past decade.   No osteoporosis. He has AVN of left hip     Calcium is not high     Parathyroid scan is negative        ==========================================  PE    There were no vitals taken for this visit.  AOx3  No visible goiter  Normal Respiratory Effort   No Edema    Labs          ==========================================      ICD-10-CM ICD-9-CM   1. Secondary hyperparathyroidism, non-renal (HCC)  E21.1 252.02   -    Elevated PTH w low vitamin D is secondary hyperparathyroidism   Nephrolithiasis can't be linked to elevated PTH unless hypercalcemia develops or at the very least calcium is in the upper range of normal  In addition , parathyroid scan is negative  Important to separate the dx of primary hyperparathyroidism from secondary . In this case , it is secondary.     There is an entity called normocalcemic primary hyperparathyroidism but in that setting calcium is in the upper range of normal and there is a high association w osteoporosis. Let's not confuse AVN w osteoporosis. A DXA could have been ordered to see if there was osteoporosis to build an argument for this dx but at this point focus on correcting vit D .  Also a 24 h urine could have been ordered to observe if there was hypercalciuria but he states no stones for more than 1 decade.       PTHrp  shouldn't be measured unless calcium were to be high    My recommendation   Correct vitamin D  Suggest 400 to 600 mg of calcium citrate bid    Don't measure PTH unless calcium is high        --    I suggest to also reevaluate need for levothyroxine. His highest TSH was a 6.18 .  Suggest to stop levothyroxine and restart only if TSH rises more than 10     I will see as needed     No orders of the defined types were placed in this encounter.               This document has been electronically signed by Mike Stevenson MD on June 27, 2022 19:54 CDT

## 2022-09-01 ENCOUNTER — OFFICE VISIT (OUTPATIENT)
Dept: FAMILY MEDICINE CLINIC | Facility: CLINIC | Age: 62
End: 2022-09-01

## 2022-09-01 VITALS
OXYGEN SATURATION: 98 % | HEART RATE: 109 BPM | BODY MASS INDEX: 31.83 KG/M2 | DIASTOLIC BLOOD PRESSURE: 90 MMHG | WEIGHT: 248 LBS | HEIGHT: 74 IN | SYSTOLIC BLOOD PRESSURE: 130 MMHG

## 2022-09-01 DIAGNOSIS — K08.89 TOOTH PAIN: Primary | ICD-10-CM

## 2022-09-01 PROCEDURE — 99213 OFFICE O/P EST LOW 20 MIN: CPT | Performed by: STUDENT IN AN ORGANIZED HEALTH CARE EDUCATION/TRAINING PROGRAM

## 2022-09-01 RX ORDER — CLINDAMYCIN HYDROCHLORIDE 300 MG/1
300 CAPSULE ORAL 3 TIMES DAILY
Qty: 21 CAPSULE | Refills: 0 | Status: SHIPPED | OUTPATIENT
Start: 2022-09-01

## 2022-09-01 NOTE — PROGRESS NOTES
"  Family Medicine Residency  Channing Gomez MD    Subjective:     Martin Bender is a 61 y.o. male who presents for tooth pain.     Symptoms started two nights ago. \"I've always had bad teeth. I've had most of them pulled, but I still have a few that are bad.\"     Patient reports voice change by the tooth. It is difficult to understand him. \"My jaw is swelled up.\"     Reports feeling of swelling \"inside and outside mouth.\" Also pain.     Currently taking Tylenol for pain.     Patient has penicillin allergy. He is unsure what type of allergy.       The following portions of the patient's history were reviewed and updated as appropriate: allergies, current medications, past family history, past medical history, past social history, past surgical history and problem list.    Past Medical Hx:  Past Medical History:   Diagnosis Date   • Age related cataract     right   • Artificial lens present     in position   • Dyslipidemia    • Encounter for general adult medical examination without abnormal findings    • Gastroesophageal reflux disease    • Hashimoto's thyroiditis    • Headache    • Kidney stone    • Primary hyperparathyroidism (HCC)     possible primary normocalcemic but vit D has to be corrected first   • Secondary hyperparathyroidism (HCC)    • Seizure disorder (HCC)        Past Surgical Hx:  Past Surgical History:   Procedure Laterality Date   • COLONOSCOPY  2015   • CYSTOSCOPY  01/11/2016    Left ureteroscopy with laser lithotripsy.Left retrograde pyelography.Left double J stent insertion, double J stent with no tether. [facility]. Per patient had stone removal \"25 times\"       Current Meds:    Current Outpatient Medications:   •  aspirin 81 MG chewable tablet, Chew 1 tablet Daily., Disp: 30 tablet, Rfl: 11  •  atorvastatin (LIPITOR) 80 MG tablet, TAKE ONE TABLET BY MOUTH EVERY DAY, Disp: 90 tablet, Rfl: 2  •  baclofen (LIORESAL) 10 MG tablet, TAKE ONE TABLET BY MOUTH THREE TIMES DAILY, " "Disp: 90 tablet, Rfl: 0  •  levETIRAcetam (KEPPRA) 1000 MG tablet, TAKE ONE TABLET BY MOUTH TWICE DAILY, Disp: 14 tablet, Rfl: 0  •  levothyroxine (SYNTHROID, LEVOTHROID) 25 MCG tablet, TAKE 1 TABLET BY MOUTH DAILY, Disp: 90 tablet, Rfl: 0  •  meloxicam (MOBIC) 15 MG tablet, TAKE ONE TABLET BY MOUTH EVERY DAY, Disp: 30 tablet, Rfl: 1  •  clindamycin (Cleocin) 300 MG capsule, Take 1 capsule by mouth 3 (Three) Times a Day., Disp: 21 capsule, Rfl: 0    Allergies:  Allergies   Allergen Reactions   • Penicillins Rash       Family Hx:  Family History   Problem Relation Age of Onset   • Cancer Other    • Diabetes Other    • Heart disease Other    • Hypertension Other         Social History:  Social History     Socioeconomic History   • Marital status: Single   Tobacco Use   • Smoking status: Current Every Day Smoker     Packs/day: 0.50     Years: 30.00     Pack years: 15.00     Types: Cigarettes     Start date: 4/15/1993   • Smokeless tobacco: Never Used   Substance and Sexual Activity   • Alcohol use: No   • Drug use: No       Review of Systems  Review of Systems   Constitutional: Positive for appetite change (it hurts to chew, but I'm still eating soft stuff) and fever (subjective ). Negative for chills.   HENT: Positive for dental problem, drooling and voice change. Negative for trouble swallowing. Facial swelling: tooth pain     Gastrointestinal: Negative for nausea and vomiting.   Neurological: Negative for dizziness, weakness and light-headedness.       Objective:     /90   Pulse 109   Ht 188 cm (74\")   Wt 112 kg (248 lb)   SpO2 98%   BMI 31.84 kg/m²   Physical Exam  HENT:      Mouth/Throat:      Comments: Patient has several missing teeth. Upper left gum swollen with some redness. Voice clearly changed from our previous encounters.   Lymphadenopathy:      Cervical: Cervical adenopathy (patient has enlarged lymph nodes ) present.   Skin:     Comments: Significant left sided facial swelling compared to " "right. Patient has had swelling under eye.           Assessment/Plan:     Diagnoses and all orders for this visit:    1. Tooth pain (Primary)    Symptoms started two nights ago. \"I've always had bad teeth. I've had most of them pulled, but I still have a few that are bad.\" Patient reports voice change by the tooth. It is difficult to understand him. \"My jaw is swelled up.\"  Reports feeling of swelling \"inside and outside mouth.\" Also pain. Currently taking Tylenol for pain. Patient has penicillin allergy. He is unsure what type of allergy.     On physical exam, patient has clear and significant right-sided facial swelling compared to left side.  His voice is garbled and difficult to understand which is different than my previous encounters with him.  He does have palpable lymphadenopathy on exam.  His left gum is swollen compared to right though there is no clear erythema.  He has tenderness to palpation over the face.    Ordinarily, I try to steer clear of treating tooth pain with antibiotics and attempt to steer patients to appropriate dental referrals.  However, in this patient's case due to his significant swelling, voice change, lymphadenopathy, and subjective fever, I do have concern for infection that may even be spreading into sinus. In setting of penicillin allergy, will use clindamycin 300 mg TID for 7 day course. Patient to use NSAIDS for pain and swelling.     Ultimately, patient requires dentist involvement.  Patient has made dentist appointment, but they can't get him in for two more weeks. We will try to put in referral and see if we can get him in sooner.       -     Ambulatory Referral to Dentistry  -     clindamycin (Cleocin) 300 MG capsule; Take 1 capsule by mouth 3 (Three) Times a Day.  Dispense: 21 capsule; Refill: 0        · Rx changes: Prescribing clindamycin for likely infection     Follow-up:     Return if symptoms worsen or fail to improve.    Preventative:  Health Maintenance   Topic Date " Due   • ANNUAL PHYSICAL  Never done   • ZOSTER VACCINE (1 of 2) Never done   • Pneumococcal Vaccine 0-64 (2 - PCV) 01/28/2017   • COVID-19 Vaccine (3 - Booster for Pfizer series) 09/26/2021   • INFLUENZA VACCINE  10/01/2022   • COLORECTAL CANCER SCREENING  01/01/2025   • TDAP/TD VACCINES (2 - Td or Tdap) 01/28/2026   • HEPATITIS C SCREENING  Completed       Alcohol use:  reports no history of alcohol use.  Nicotine status  reports that he has been smoking cigarettes. He started smoking about 29 years ago. He has a 15.00 pack-year smoking history. He has never used smokeless tobacco.     Goals     • Less pain      Barriers:  none            RISK SCORE: 4      This document has been electronically signed by Channing Gomez MD on September 1, 2022 10:16 CDT

## 2022-09-13 DIAGNOSIS — G89.29 CHRONIC BILATERAL LOW BACK PAIN WITHOUT SCIATICA: ICD-10-CM

## 2022-09-13 DIAGNOSIS — G40.909 SEIZURE DISORDER: ICD-10-CM

## 2022-09-13 DIAGNOSIS — M54.50 CHRONIC BILATERAL LOW BACK PAIN WITHOUT SCIATICA: ICD-10-CM

## 2022-09-13 DIAGNOSIS — M25.551 RIGHT HIP PAIN: ICD-10-CM

## 2022-09-13 RX ORDER — MELOXICAM 15 MG/1
TABLET ORAL
Qty: 30 TABLET | Refills: 0 | Status: SHIPPED | OUTPATIENT
Start: 2022-09-13 | End: 2023-03-14 | Stop reason: SDUPTHER

## 2022-09-13 RX ORDER — LEVETIRACETAM 1000 MG/1
TABLET ORAL
Qty: 14 TABLET | Refills: 0 | Status: SHIPPED | OUTPATIENT
Start: 2022-09-13 | End: 2022-09-16 | Stop reason: SDUPTHER

## 2022-09-13 RX ORDER — BACLOFEN 10 MG/1
TABLET ORAL
Qty: 90 TABLET | Refills: 0 | OUTPATIENT
Start: 2022-09-13

## 2022-09-13 NOTE — H&P
Patient called for refills of mobic, keppra, and baclofen. I do not prescribe chronic muscle relaxers for low back pain. Discontinuing baclofen. Refilling the other meds.      This document has been electronically signed by Channing Gomez MD on September 13, 2022 09:21 CDT

## 2022-09-16 DIAGNOSIS — G40.909 SEIZURE DISORDER: ICD-10-CM

## 2022-09-16 NOTE — TELEPHONE ENCOUNTER
Incoming Refill Request      Medication requested (name and dose):   levETIRAcetam (KEPPRA) 1000 MG tablet  Pharmacy where request should be sent:   [facility]  Additional details provided by patient:    Best call back number: [phone]    Does the patient have less than a 3 day supply:  [x] Yes  [] No    Brady Arcos Rep  09/16/22, 13:59 CDT

## 2022-09-19 RX ORDER — LEVETIRACETAM 1000 MG/1
1000 TABLET ORAL 2 TIMES DAILY
Qty: 60 TABLET | Refills: 6 | Status: SHIPPED | OUTPATIENT
Start: 2022-09-19 | End: 2023-03-14 | Stop reason: SDUPTHER

## 2022-12-07 DIAGNOSIS — E06.3 HASHIMOTO'S THYROIDITIS: ICD-10-CM

## 2022-12-08 RX ORDER — LEVOTHYROXINE SODIUM 0.03 MG/1
25 TABLET ORAL DAILY
Qty: 90 TABLET | Refills: 0 | OUTPATIENT
Start: 2022-12-08

## 2023-03-14 ENCOUNTER — TELEPHONE (OUTPATIENT)
Dept: FAMILY MEDICINE CLINIC | Facility: CLINIC | Age: 63
End: 2023-03-14
Payer: COMMERCIAL

## 2023-03-14 DIAGNOSIS — E06.3 HASHIMOTO'S THYROIDITIS: ICD-10-CM

## 2023-03-14 DIAGNOSIS — E78.5 DYSLIPIDEMIA: ICD-10-CM

## 2023-03-14 DIAGNOSIS — G40.909 SEIZURE DISORDER: ICD-10-CM

## 2023-03-14 DIAGNOSIS — M25.551 RIGHT HIP PAIN: ICD-10-CM

## 2023-03-14 DIAGNOSIS — I63.9 CEREBROVASCULAR ACCIDENT (CVA), UNSPECIFIED MECHANISM: ICD-10-CM

## 2023-03-14 DIAGNOSIS — K08.89 TOOTH PAIN: ICD-10-CM

## 2023-03-14 RX ORDER — LEVOTHYROXINE SODIUM 0.03 MG/1
25 TABLET ORAL DAILY
Qty: 90 TABLET | Refills: 0 | Status: SHIPPED | OUTPATIENT
Start: 2023-03-14

## 2023-03-14 RX ORDER — LEVOTHYROXINE SODIUM 0.03 MG/1
25 TABLET ORAL DAILY
Qty: 90 TABLET | Refills: 0 | Status: SHIPPED | OUTPATIENT
Start: 2023-03-14 | End: 2023-03-14 | Stop reason: SDUPTHER

## 2023-03-14 RX ORDER — LEVETIRACETAM 1000 MG/1
1000 TABLET ORAL 2 TIMES DAILY
Qty: 60 TABLET | Refills: 6 | Status: SHIPPED | OUTPATIENT
Start: 2023-03-14

## 2023-03-14 RX ORDER — MELOXICAM 15 MG/1
15 TABLET ORAL DAILY
Qty: 30 TABLET | Refills: 0 | Status: SHIPPED | OUTPATIENT
Start: 2023-03-14

## 2023-03-14 RX ORDER — ASPIRIN 81 MG/1
81 TABLET, CHEWABLE ORAL DAILY
Qty: 30 TABLET | Refills: 11 | Status: SHIPPED | OUTPATIENT
Start: 2023-03-14

## 2023-03-14 RX ORDER — LEVETIRACETAM 1000 MG/1
1000 TABLET ORAL 2 TIMES DAILY
Qty: 60 TABLET | Refills: 6 | Status: SHIPPED | OUTPATIENT
Start: 2023-03-14 | End: 2023-03-14 | Stop reason: SDUPTHER

## 2023-03-14 RX ORDER — ATORVASTATIN CALCIUM 80 MG/1
80 TABLET, FILM COATED ORAL DAILY
Qty: 90 TABLET | Refills: 2 | Status: SHIPPED | OUTPATIENT
Start: 2023-03-14 | End: 2023-03-14 | Stop reason: SDUPTHER

## 2023-03-14 RX ORDER — ASPIRIN 81 MG/1
81 TABLET, CHEWABLE ORAL DAILY
Qty: 30 TABLET | Refills: 11 | Status: SHIPPED | OUTPATIENT
Start: 2023-03-14 | End: 2023-03-14 | Stop reason: SDUPTHER

## 2023-03-14 RX ORDER — MELOXICAM 15 MG/1
15 TABLET ORAL DAILY
Qty: 30 TABLET | Refills: 0 | Status: SHIPPED | OUTPATIENT
Start: 2023-03-14 | End: 2023-03-14 | Stop reason: SDUPTHER

## 2023-03-14 RX ORDER — ATORVASTATIN CALCIUM 80 MG/1
80 TABLET, FILM COATED ORAL DAILY
Qty: 90 TABLET | Refills: 2 | Status: SHIPPED | OUTPATIENT
Start: 2023-03-14

## 2023-03-14 NOTE — TELEPHONE ENCOUNTER
Patient called stating he needs all of his medications refilled and sent to [facility].

## 2023-04-10 ENCOUNTER — OFFICE VISIT (OUTPATIENT)
Dept: FAMILY MEDICINE CLINIC | Facility: CLINIC | Age: 63
End: 2023-04-10
Payer: COMMERCIAL

## 2023-04-10 VITALS
OXYGEN SATURATION: 94 % | DIASTOLIC BLOOD PRESSURE: 86 MMHG | HEIGHT: 74 IN | SYSTOLIC BLOOD PRESSURE: 140 MMHG | WEIGHT: 273.5 LBS | BODY MASS INDEX: 35.1 KG/M2 | HEART RATE: 62 BPM

## 2023-04-10 DIAGNOSIS — E55.9 VITAMIN D DEFICIENCY: ICD-10-CM

## 2023-04-10 DIAGNOSIS — F17.210 TOBACCO DEPENDENCE DUE TO CIGARETTES: ICD-10-CM

## 2023-04-10 DIAGNOSIS — G40.909 SEIZURE DISORDER: ICD-10-CM

## 2023-04-10 DIAGNOSIS — M25.551 RIGHT HIP PAIN: Primary | ICD-10-CM

## 2023-04-10 RX ORDER — MELOXICAM 15 MG/1
15 TABLET ORAL DAILY
Qty: 30 TABLET | Refills: 0 | Status: SHIPPED | OUTPATIENT
Start: 2023-04-10

## 2023-04-13 NOTE — PROGRESS NOTES
"  Family Medicine Residency  Gigi Johnson MD    Subjective:     Martin Bender is a 62 y.o. male who presents for follow up and management of seizure disorder, Vit D deficiency, and chronic hip pain.    Patient reports last seizure was 10-15 years ago and he has been well managed on Keppra 1000mg BID. Reports chronic hip pain has not worsened and he is being followed by ortho for condition. Vit D on labs obtained 5/24/2022 was 10.4. Denies any other complaints at this time.    The following portions of the patient's history were reviewed and updated as appropriate: allergies, current medications, past family history, past medical history, past social history, past surgical history and problem list.    Past Medical Hx:  Past Medical History:   Diagnosis Date   • Age related cataract     right   • Artificial lens present     in position   • Dyslipidemia    • Encounter for general adult medical examination without abnormal findings    • Gastroesophageal reflux disease    • Hashimoto's thyroiditis    • Headache    • Kidney stone    • Primary hyperparathyroidism     possible primary normocalcemic but vit D has to be corrected first   • Secondary hyperparathyroidism    • Seizure disorder        Past Surgical Hx:  Past Surgical History:   Procedure Laterality Date   • COLONOSCOPY  2015   • CYSTOSCOPY  01/11/2016    Left ureteroscopy with laser lithotripsy.Left retrograde pyelography.Left double J stent insertion, double J stent with no tether. [facility]. Per patient had stone removal \"25 times\"       Current Meds:    Current Outpatient Medications:   •  aspirin 81 MG chewable tablet, Chew 1 tablet Daily., Disp: 30 tablet, Rfl: 11  •  atorvastatin (LIPITOR) 80 MG tablet, Take 1 tablet by mouth Daily., Disp: 90 tablet, Rfl: 2  •  clindamycin (Cleocin) 300 MG capsule, Take 1 capsule by mouth 3 (Three) Times a Day., Disp: 21 capsule, Rfl: 0  •  levETIRAcetam (KEPPRA) 1000 MG tablet, Take 1 tablet by " "mouth 2 (Two) Times a Day., Disp: 60 tablet, Rfl: 6  •  levothyroxine (SYNTHROID, LEVOTHROID) 25 MCG tablet, Take 1 tablet by mouth Daily., Disp: 90 tablet, Rfl: 0  •  meloxicam (MOBIC) 15 MG tablet, Take 1 tablet by mouth Daily., Disp: 30 tablet, Rfl: 0  •  vitamin D3 (Vitamin D) 125 MCG (5000 UT) capsule capsule, Take 1 capsule by mouth Daily., Disp: 90 capsule, Rfl: 0    Allergies:  Allergies   Allergen Reactions   • Penicillins Rash       Family Hx:  Family History   Problem Relation Age of Onset   • Cancer Other    • Diabetes Other    • Heart disease Other    • Hypertension Other         Social History:  Social History     Socioeconomic History   • Marital status: Single   Tobacco Use   • Smoking status: Every Day     Packs/day: 0.50     Years: 30.00     Pack years: 15.00     Types: Cigarettes     Start date: 4/15/1993   • Smokeless tobacco: Never   Substance and Sexual Activity   • Alcohol use: No   • Drug use: No       Review of Systems  Review of Systems   Constitutional: Negative for chills and fever.   HENT: Negative for congestion, rhinorrhea and sore throat.    Respiratory: Negative for chest tightness and shortness of breath.    Cardiovascular: Negative for chest pain and leg swelling.   Gastrointestinal: Negative for abdominal pain, nausea and vomiting.   Genitourinary: Negative.    Neurological: Negative for dizziness, light-headedness and headaches.   Psychiatric/Behavioral: Negative for agitation, behavioral problems and confusion.       Objective:     /86   Pulse 62   Ht 188 cm (74\")   Wt 124 kg (273 lb 8 oz)   SpO2 94%   BMI 35.12 kg/m²   Physical Exam  Vitals reviewed.   Constitutional:       General: He is not in acute distress.     Appearance: He is obese.   HENT:      Head: Normocephalic and atraumatic.      Right Ear: External ear normal.      Left Ear: External ear normal.   Eyes:      General: No scleral icterus.     Extraocular Movements: Extraocular movements intact. "   Cardiovascular:      Rate and Rhythm: Normal rate and regular rhythm.      Pulses: Normal pulses.      Heart sounds: Normal heart sounds.   Pulmonary:      Effort: Pulmonary effort is normal.      Breath sounds: Normal breath sounds.   Abdominal:      General: Bowel sounds are normal.      Palpations: Abdomen is soft.      Tenderness: There is no abdominal tenderness.   Musculoskeletal:      Right lower leg: No edema.      Left lower leg: No edema.   Skin:     General: Skin is warm and dry.   Neurological:      Mental Status: He is alert and oriented to person, place, and time.   Psychiatric:         Mood and Affect: Mood normal.         Behavior: Behavior normal.          Assessment/Plan:     Diagnoses and all orders for this visit:    1. Right hip pain (Primary)  -     meloxicam (MOBIC) 15 MG tablet; Take 1 tablet by mouth Daily.  Dispense: 30 tablet; Refill: 0    2. Seizure disorder    3. Tobacco dependence due to cigarettes  -     CT Chest Low Dose Wo; Future    4. Vitamin D deficiency  -     vitamin D3 (Vitamin D) 125 MCG (5000 UT) capsule capsule; Take 1 capsule by mouth Daily.  Dispense: 90 capsule; Refill: 0      Chronic hip pain   Continue meloxicam 15mg. Orthopedics managing condition. Encourage patient to make follow up appointment with ortho.    Seizure disorder- stable  Last seizure reported 10+ years ago by patient. States he has not been back to neurologist in a long time because his condition has been stable. Encouraged patient to make appointment with neurology.     Tobacco use- ~46 year pack history  Patient stated he is not interested in quitting smoking at this time. He is agreeable to obtaining Low dose chest CT. Imaging was also ordered at patient's last visit on 5/24/2022 however it was never obtained.     Vit D deficiency- Vit D 10.4(5/24/2022)  Patient initiated on 5000U PO daily. Will recheck level in 3 months.    · Rx changes: see a/p  · Patient Education: see a/p         Follow-up:      Return in about 3 months (around 7/10/2023).    Preventative:  Health Maintenance   Topic Date Due   • ZOSTER VACCINE (1 of 2) Never done   • ANNUAL PHYSICAL  Never done   • Pneumococcal Vaccine 0-64 (2 - PCV) 01/28/2017   • COVID-19 Vaccine (3 - Booster for Pfizer series) 06/21/2021   • INFLUENZA VACCINE  08/01/2023   • COLORECTAL CANCER SCREENING  01/01/2025   • TDAP/TD VACCINES (2 - Td or Tdap) 01/28/2026   • HEPATITIS C SCREENING  Completed       Weight  Body mass index is 35.12 kg/m².      Alcohol use:  reports no history of alcohol use.  Nicotine status  reports that he has been smoking cigarettes. He started smoking about 30 years ago. He has a 15.00 pack-year smoking history. He has never used smokeless tobacco.     Goals     • Less pain      Barriers:  none            RISK SCORE: 3      This document has been electronically signed by Gigi Johnson MD on April 13, 2023 16:10 CDT

## 2023-04-18 NOTE — PROGRESS NOTES
I have spoken with the patient .  I have reviewed the notes, assessments, and/or procedures performed by Dr. Gigi Johnson,   I concur with   his  documentation and assessment and plan for Martin Bender.          This document has been electronically signed by Isaias Ruano MD on April 18, 2023 10:40 CDT

## 2023-05-01 ENCOUNTER — OFFICE VISIT (OUTPATIENT)
Dept: FAMILY MEDICINE CLINIC | Facility: CLINIC | Age: 63
End: 2023-05-01
Payer: COMMERCIAL

## 2023-05-01 VITALS
OXYGEN SATURATION: 98 % | SYSTOLIC BLOOD PRESSURE: 120 MMHG | WEIGHT: 274.5 LBS | HEART RATE: 124 BPM | BODY MASS INDEX: 35.23 KG/M2 | TEMPERATURE: 96.5 F | DIASTOLIC BLOOD PRESSURE: 80 MMHG | HEIGHT: 74 IN

## 2023-05-01 DIAGNOSIS — E55.9 VITAMIN D DEFICIENCY: ICD-10-CM

## 2023-05-01 DIAGNOSIS — M25.551 RIGHT HIP PAIN: ICD-10-CM

## 2023-05-01 RX ORDER — MELOXICAM 15 MG/1
15 TABLET ORAL DAILY
Qty: 30 TABLET | Refills: 2 | Status: SHIPPED | OUTPATIENT
Start: 2023-05-01

## 2023-05-03 NOTE — PROGRESS NOTES
"  Family Medicine Residency  Kaycee Sheikh MD    Subjective:     Martin Bender is a 62 y.o. male who presents for a 3 week follow up. Patient has a history of a seizure disorder, avascular necrosis of the left hip, hashimoto's thyroiditis. He is unsure as to why he is following up today. He does report having bilateral leg pain and needing a refill of one of his medications. He states that he was previously seeing orthopedics for his hip pain and recently received xray imaging. Upon review it appears last year patient did not show to his orthopedic appointment and xray imaging was taken several months before this appointment was made. He takes Mobic at this time which he states relieves his pain but is beginning to not work as well. He has no further complaints today.    The following portions of the patient's history were reviewed and updated as appropriate: allergies, current medications, past family history, past medical history, past social history, past surgical history and problem list.    Past Medical Hx:  Past Medical History:   Diagnosis Date   • Age related cataract     right   • Artificial lens present     in position   • Dyslipidemia    • Encounter for general adult medical examination without abnormal findings    • Gastroesophageal reflux disease    • Hashimoto's thyroiditis    • Headache    • Kidney stone    • Primary hyperparathyroidism     possible primary normocalcemic but vit D has to be corrected first   • Secondary hyperparathyroidism    • Seizure disorder        Past Surgical Hx:  Past Surgical History:   Procedure Laterality Date   • COLONOSCOPY  2015   • CYSTOSCOPY  01/11/2016    Left ureteroscopy with laser lithotripsy.Left retrograde pyelography.Left double J stent insertion, double J stent with no tether. [facility]. Per patient had stone removal \"25 times\"       Current Meds:    Current Outpatient Medications:   •  aspirin 81 MG chewable tablet, Chew 1 " tablet Daily., Disp: 30 tablet, Rfl: 11  •  atorvastatin (LIPITOR) 80 MG tablet, Take 1 tablet by mouth Daily., Disp: 90 tablet, Rfl: 2  •  clindamycin (Cleocin) 300 MG capsule, Take 1 capsule by mouth 3 (Three) Times a Day., Disp: 21 capsule, Rfl: 0  •  levETIRAcetam (KEPPRA) 1000 MG tablet, Take 1 tablet by mouth 2 (Two) Times a Day., Disp: 60 tablet, Rfl: 6  •  levothyroxine (SYNTHROID, LEVOTHROID) 25 MCG tablet, Take 1 tablet by mouth Daily., Disp: 90 tablet, Rfl: 0  •  meloxicam (MOBIC) 15 MG tablet, Take 1 tablet by mouth Daily., Disp: 30 tablet, Rfl: 2  •  vitamin D3 (Vitamin D) 125 MCG (5000 UT) capsule capsule, Take 1 capsule by mouth Daily., Disp: 90 capsule, Rfl: 0    Allergies:  Allergies   Allergen Reactions   • Penicillins Rash       Family Hx:  Family History   Problem Relation Age of Onset   • Cancer Other    • Diabetes Other    • Heart disease Other    • Hypertension Other         Social History:  Social History     Socioeconomic History   • Marital status: Single   Tobacco Use   • Smoking status: Every Day     Packs/day: 0.50     Years: 30.00     Pack years: 15.00     Types: Cigarettes     Start date: 4/15/1993   • Smokeless tobacco: Never   Substance and Sexual Activity   • Alcohol use: No   • Drug use: No       Review of Systems  Review of Systems   Constitutional: Negative for activity change, appetite change, chills, fatigue and fever.   HENT: Negative for congestion, rhinorrhea and sore throat.    Eyes: Negative for visual disturbance.   Respiratory: Positive for wheezing. Negative for cough, chest tightness and shortness of breath.    Cardiovascular: Negative for chest pain, palpitations and leg swelling.   Gastrointestinal: Negative for abdominal pain, constipation, diarrhea, nausea and vomiting.   Genitourinary: Negative for difficulty urinating, dysuria, flank pain, hematuria and urgency.   Musculoskeletal: Positive for arthralgias and myalgias. Negative for back pain and joint swelling.  "  Skin: Negative for rash and wound.   Neurological: Negative for dizziness, tremors, syncope, weakness, light-headedness and headaches.   Psychiatric/Behavioral: Negative for confusion, dysphoric mood, sleep disturbance and suicidal ideas. The patient is not nervous/anxious.        Objective:     /80   Pulse (!) 124   Temp 96.5 °F (35.8 °C)   Ht 188 cm (74\")   Wt 125 kg (274 lb 8 oz)   SpO2 98%   BMI 35.24 kg/m²   Physical Exam  Constitutional:       General: He is not in acute distress.  HENT:      Head: Normocephalic.   Eyes:      Extraocular Movements: Extraocular movements intact.      Conjunctiva/sclera: Conjunctivae normal.      Pupils: Pupils are equal, round, and reactive to light.   Cardiovascular:      Rate and Rhythm: Normal rate and regular rhythm.      Pulses: Normal pulses.      Heart sounds: Normal heart sounds.   Pulmonary:      Effort: Pulmonary effort is normal.      Breath sounds: Examination of the right-lower field reveals wheezing. Examination of the left-lower field reveals wheezing. Wheezing present.   Abdominal:      General: Abdomen is flat. Bowel sounds are normal.      Palpations: Abdomen is soft.      Tenderness: There is no abdominal tenderness. There is no guarding.   Musculoskeletal:         General: Tenderness present.      Cervical back: Normal range of motion and neck supple.      Comments: Bilateral hip pain and reduced ROM   Skin:     General: Skin is warm.      Capillary Refill: Capillary refill takes 2 to 3 seconds.   Neurological:      Mental Status: He is alert and oriented to person, place, and time.   Psychiatric:         Mood and Affect: Mood normal.         Behavior: Behavior normal.         Thought Content: Thought content normal.         Judgment: Judgment normal.          Assessment/Plan:     Diagnoses and all orders for this visit:    1. Right hip pain  -     meloxicam (MOBIC) 15 MG tablet; Take 1 tablet by mouth Daily.  Dispense: 30 tablet; Refill: 2  -  "    Ambulatory Referral to Orthopedic Surgery    Plan:  - Will re-order Mobic for bilateral hip pain.   - Patient did not show for previous orthopedic appointment, will place another referral, I encouraged patient to attend this appointment for further treatment options.  - May consider pain management in the future for this patient if treatment options are limited.       · Rx changes: See A/P  · Patient Education: See A/P  · Compliance at present is estimated to be satisfactory.   · Efforts to improve compliance (if necessary) will be directed at take medications as prescribed.    Depression screening: Patient screened positive for depression based on a PHQ-9 score of 0 on 4/25/2022.      Follow-up:     Return in about 6 months (around 11/1/2023) for Annual.    Preventative:  Health Maintenance   Topic Date Due   • ZOSTER VACCINE (1 of 2) Never done   • ANNUAL PHYSICAL  Never done   • Pneumococcal Vaccine 0-64 (2 - PCV) 01/28/2017   • COVID-19 Vaccine (3 - Booster for Pfizer series) 06/21/2021   • INFLUENZA VACCINE  08/01/2023   • COLORECTAL CANCER SCREENING  01/01/2025   • TDAP/TD VACCINES (2 - Td or Tdap) 01/28/2026   • HEPATITIS C SCREENING  Completed     Annual physical due at next appointment       Alcohol use:  reports no history of alcohol use.  Nicotine status  reports that he has been smoking cigarettes. He started smoking about 30 years ago. He has a 15.00 pack-year smoking history. He has never used smokeless tobacco.     Goals     • Less pain      Barriers:  none            RISK SCORE: 3        This document has been electronically signed by Kaycee Sheikh MD on May 3, 2023 17:04 CDT

## 2023-05-08 DIAGNOSIS — M25.551 RIGHT HIP PAIN: Primary | ICD-10-CM

## 2023-05-10 ENCOUNTER — OFFICE VISIT (OUTPATIENT)
Dept: ORTHOPEDIC SURGERY | Facility: CLINIC | Age: 63
End: 2023-05-10
Payer: COMMERCIAL

## 2023-05-10 VITALS — BODY MASS INDEX: 35.16 KG/M2 | WEIGHT: 274 LBS | HEIGHT: 74 IN

## 2023-05-10 DIAGNOSIS — M79.18 MUSCLE PAIN, LUMBAR: Primary | ICD-10-CM

## 2023-05-10 DIAGNOSIS — M25.551 RIGHT HIP PAIN: ICD-10-CM

## 2023-05-10 RX ORDER — TRIAMCINOLONE ACETONIDE 40 MG/ML
80 INJECTION, SUSPENSION INTRA-ARTICULAR; INTRAMUSCULAR ONCE
Status: COMPLETED | OUTPATIENT
Start: 2023-05-10 | End: 2023-05-10

## 2023-05-10 RX ADMIN — TRIAMCINOLONE ACETONIDE 80 MG: 40 INJECTION, SUSPENSION INTRA-ARTICULAR; INTRAMUSCULAR at 14:30

## 2023-05-10 NOTE — PROGRESS NOTES
Martin Bender is a 62 y.o. male   Primary provider:  Channing Gomez MD       Chief Complaint   Patient presents with   • Right Hip - Hip Pain       HISTORY OF PRESENT ILLNESS:    Mr. Bender is a 62-year-old male who presents today with complaints of right hip pain.  He reports its been there for 2+ years.  He describes pain as moderate and constant.  Pain is a grinding, stabbing, aching, burning pain.  He reports occasional swelling and popping.  Standing and walking aggravates pain.  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  He has had MRIs and x-rays of his hip previously which has shown AVN.  He also had an MRI of his low back in 2019 which showed degenerative disc disease.  He is sent for updated x-rays.      Hip Pain   The incident occurred more than 1 week ago. There was no injury mechanism. The pain is present in the right hip. The quality of the pain is described as aching and stabbing (GRINDING. ). The pain is moderate. The pain has been constant since onset. Associated symptoms comments: CLICKING, POPPING, SWELLING. He reports no foreign bodies present. Exacerbated by: WALKING, STANDING.  He has tried rest (XRAYS DONE TODAY) for the symptoms.        CONCURRENT MEDICAL HISTORY:    Past Medical History:   Diagnosis Date   • Age related cataract     right   • Artificial lens present     in position   • Dyslipidemia    • Encounter for general adult medical examination without abnormal findings    • Gastroesophageal reflux disease    • Hashimoto's thyroiditis    • Headache    • Kidney stone    • Primary hyperparathyroidism     possible primary normocalcemic but vit D has to be corrected first   • Secondary hyperparathyroidism    • Seizure disorder        Allergies   Allergen Reactions   • Penicillins Rash         Current Outpatient Medications:   •  aspirin 81 MG chewable tablet, Chew 1 tablet Daily., Disp: 30 tablet, Rfl: 11  •  atorvastatin (LIPITOR) 80  "MG tablet, Take 1 tablet by mouth Daily., Disp: 90 tablet, Rfl: 2  •  clindamycin (Cleocin) 300 MG capsule, Take 1 capsule by mouth 3 (Three) Times a Day., Disp: 21 capsule, Rfl: 0  •  levETIRAcetam (KEPPRA) 1000 MG tablet, Take 1 tablet by mouth 2 (Two) Times a Day., Disp: 60 tablet, Rfl: 6  •  levothyroxine (SYNTHROID, LEVOTHROID) 25 MCG tablet, Take 1 tablet by mouth Daily., Disp: 90 tablet, Rfl: 0  •  meloxicam (MOBIC) 15 MG tablet, Take 1 tablet by mouth Daily., Disp: 30 tablet, Rfl: 2  •  vitamin D3 (Vitamin D) 125 MCG (5000 UT) capsule capsule, Take 1 capsule by mouth Daily., Disp: 90 capsule, Rfl: 0  No current facility-administered medications for this visit.    Past Surgical History:   Procedure Laterality Date   • COLONOSCOPY  2015   • CYSTOSCOPY  01/11/2016    Left ureteroscopy with laser lithotripsy.Left retrograde pyelography.Left double J stent insertion, double J stent with no tether. [facility]. Per patient had stone removal \"25 times\"       Family History   Problem Relation Age of Onset   • Cancer Other    • Diabetes Other    • Heart disease Other    • Hypertension Other        Social History     Socioeconomic History   • Marital status: Single   Tobacco Use   • Smoking status: Every Day     Packs/day: 0.50     Years: 30.00     Pack years: 15.00     Types: Cigarettes     Start date: 4/15/1993   • Smokeless tobacco: Never   Substance and Sexual Activity   • Alcohol use: No   • Drug use: No        Review of Systems   Constitutional: Negative.    HENT: Negative.    Eyes: Negative.    Respiratory: Negative.    Cardiovascular: Negative.    Gastrointestinal: Negative.    Endocrine: Negative.    Genitourinary: Negative.    Musculoskeletal: Negative.    Skin: Negative.    Allergic/Immunologic: Negative.    Neurological: Negative.    Hematological: Negative.    Psychiatric/Behavioral: Negative.    Hip pain    PHYSICAL EXAMINATION:       Ht 188 cm (74\")   Wt 124 kg (274 lb)   BMI 35.18 " kg/m²     Physical Exam  Vitals and nursing note reviewed.   Constitutional:       General: He is not in acute distress.     Appearance: He is well-developed. He is not toxic-appearing.   HENT:      Head: Normocephalic.   Pulmonary:      Effort: Pulmonary effort is normal. No respiratory distress.   Musculoskeletal:        Back:       Comments: Area of concern marked in red above.  Mild diffuse muscle tenderness with palpation  No bony tenderness in low back    Normal range of motion of hip.  No pain with range of motion of hip.  Negative Stinchfield test   Skin:     General: Skin is warm and dry.   Neurological:      Mental Status: He is alert and oriented to person, place, and time.   Psychiatric:         Behavior: Behavior normal.         Thought Content: Thought content normal.         Judgment: Judgment normal.         GAIT:     []  Normal  []  Antalgic    Assistive device: []  None  []  Walker     []  Crutches  []  Cane     []  Wheelchair  []  Stretcher            XR Hip With or Without Pelvis 2 - 3 View Right    Result Date: 5/10/2023  Narrative: FINDINGS: An AP view of the pelvis and AP and lateral views of the right hip were obtained. There is some degenerative sclerosis of the right acetabulum.  This is mild to moderate in degree.  There is also seen to be some sclerotic change of the femoral heads.  I cannot exclude a component of avascular necrosis accounting for this appearance.  MRI could further evaluate, if felt to be indicated. Otherwise negative.  Negative for fracture or other acute abnormality.          ASSESSMENT:    Diagnoses and all orders for this visit:    Muscle pain, lumbar    Right hip pain  -     triamcinolone acetonide (KENALOG-40) injection 80 mg          PLAN    X-rays reviewed, no acute bony abnormality identified.  AVN appears stable.  Patient's pain today is more consistent with muscular pain, this could be due to patient's caregiver role.  He reports steroid injection in the past  worked greatly to relieve his pain.  After discussing available treatment options including risk, benefits, alternatives, patient desires to proceed with IM injection of Kenalog today.  Patient to return in 2 to 4 weeks for recheck if not improving.    Time spent of a minimum of 30 minutes including the face to face evaluation, reviewing of medical history and prior medial records, reviewing of diagnostic studies, ordering additional tests, documentation, patient education and coordination of care.       EMR Dragon/Transciption Disclaimer: Some of this note may be an electronic transcription/translation of spoken language to printed text.  The electronic translation of spoken language may permit erroneous, or at times, nonsensical words or phrases to be inadvertently transcribed. Although I have reviewed the note for such errors, some may still exist.       This document has been electronically signed by Silvia VARELA on May 10, 2023 14:43 CDT

## 2023-05-22 ENCOUNTER — HOSPITAL ENCOUNTER (OUTPATIENT)
Dept: CT IMAGING | Facility: HOSPITAL | Age: 63
Discharge: HOME | End: 2023-05-22
Admitting: FAMILY MEDICINE
Payer: COMMERCIAL

## 2023-05-22 DIAGNOSIS — F17.210 TOBACCO DEPENDENCE DUE TO CIGARETTES: ICD-10-CM

## 2023-05-22 PROCEDURE — 71271 CT THORAX LUNG CANCER SCR C-: CPT

## 2023-05-25 DIAGNOSIS — E06.3 HASHIMOTO'S THYROIDITIS: ICD-10-CM

## 2023-05-25 RX ORDER — LEVOTHYROXINE SODIUM 0.03 MG/1
25 TABLET ORAL DAILY
Qty: 90 TABLET | Refills: 1 | Status: SHIPPED | OUTPATIENT
Start: 2023-05-25

## 2023-06-13 ENCOUNTER — TELEPHONE (OUTPATIENT)
Dept: ONCOLOGY | Facility: HOSPITAL | Age: 63
End: 2023-06-13
Payer: COMMERCIAL

## 2023-06-13 NOTE — TELEPHONE ENCOUNTER
PT called Navigator and left VM asking to be removed from Lung Nodule screening program. PT removed from [facility] database at pt's request.

## 2023-07-18 ENCOUNTER — APPOINTMENT (OUTPATIENT)
Dept: PHYSICAL THERAPY | Facility: HOSPITAL | Age: 63
End: 2023-07-18
Payer: COMMERCIAL

## 2023-07-20 ENCOUNTER — DOCUMENTATION (OUTPATIENT)
Dept: PHYSICAL THERAPY | Facility: HOSPITAL | Age: 63
End: 2023-07-20
Payer: COMMERCIAL

## 2023-07-20 ENCOUNTER — HOSPITAL ENCOUNTER (OUTPATIENT)
Dept: PHYSICAL THERAPY | Facility: HOSPITAL | Age: 63
Setting detail: THERAPIES SERIES
End: 2023-07-20
Payer: COMMERCIAL

## 2023-07-20 DIAGNOSIS — M53.3 CHRONIC SI JOINT PAIN: ICD-10-CM

## 2023-07-20 DIAGNOSIS — G89.29 CHRONIC SI JOINT PAIN: ICD-10-CM

## 2023-07-20 DIAGNOSIS — M25.551 RIGHT HIP PAIN: Primary | ICD-10-CM

## 2023-07-20 NOTE — THERAPY DISCHARGE NOTE
"   Outpatient Physical Therapy Ortho Treatment Note/Discharge Summary       Patient Name: Martin Bender  : 1960  MRN: [id number]  Today's Date: 2023      Visit Date: 2023    Visit Dx:    ICD-10-CM ICD-9-CM   1. Right hip pain  M25.551 719.45   2. Chronic SI joint pain  M53.3 724.6    G89.29 338.29       Patient Active Problem List   Diagnosis    Renal calculi    Seizure disorder    Cerebrovascular accident    Gastroesophageal reflux disease    Dyslipidemia    Hashimoto's thyroiditis    Primary hyperparathyroidism    Tobacco dependence    Positive hepatitis C antibody test    Bilateral hip pain    DDD (degenerative disc disease), lumbar    Avascular necrosis of bone of left hip    Right hip pain    Tinnitus        Past Medical History:   Diagnosis Date    Age related cataract     right    Artificial lens present     in position    Dyslipidemia     Encounter for general adult medical examination without abnormal findings     Gastroesophageal reflux disease     Hashimoto's thyroiditis     Headache     Kidney stone     Primary hyperparathyroidism     possible primary normocalcemic but vit D has to be corrected first    Secondary hyperparathyroidism     Seizure disorder         Past Surgical History:   Procedure Laterality Date    COLONOSCOPY      CYSTOSCOPY  2016    Left ureteroscopy with laser lithotripsy.Left retrograde pyelography.Left double J stent insertion, double J stent with no tether. [facility] [place] [place]. Per patient had stone removal \"25 times\"                        PT Assessment/Plan       Row Name 23 1300          PT Assessment    Assessment Comments Pt presents today with mother and reports that he doesnt want to proceed with PT today due to his increasing pain. He reports that he has failed the injection and 4 PT visits and feels he is getting worse. He wishes to save visits until he sees MD on  as f/u to see if surgery will be performed or not.  No tx " "performed this date.  -EM               User Key  (r) = Recorded By, (t) = Taken By, (c) = Cosigned By      Initials Name Provider Type    Landon Daniel PTA Physical Therapist Assistant                         OP Exercises       Row Name 07/20/23 1300             Subjective Comments    Subjective Comments Pt presents to PT requesting d/c due to feeling PT is making his hip hurt worse. He reports the injections and PT thus far has not improved. He is to see ortho on 8/4.  -EM                User Key  (r) = Recorded By, (t) = Taken By, (c) = Cosigned By      Initials Name Provider Type    EM Landon Alaniz PTA Physical Therapist Assistant                                    PT OP Goals       Row Name 07/20/23 1300          PT Short Term Goals    STG Date to Achieve 07/20/23  -EM     STG 1 independent and compliant with HEP  -EM     STG 1 Progress Not Met  -EM        Long Term Goals    LTG Date to Achieve 08/10/23  -EM     LTG 1 5x sit to stand 15\" no ue support  -EM     LTG 1 Progress Not Met  -EM     LTG 2 LEFS 50  -EM     LTG 2 Progress Not Met  -EM     LTG 3 bilateral  hip abd/ext/ir 5/5  -EM     LTG 3 Progress Not Met  -EM     LTG 4 right hip ir 10 degrees  -EM     LTG 4 Progress Not Met  -EM     LTG 5 no pain with passive hip ROM  -EM     LTG 5 Progress Not Met  -EM     LTG 6 worst pain in at least a week 5/10  -EM     LTG 6 Progress Not Met  -EM     LTG 7 SLS 10\" bilaterally  -EM     LTG 7 Progress Not Met  -EM     LTG 8 20 double heel taps no pain  -EM     LTG 8 Progress Not Met  -EM               User Key  (r) = Recorded By, (t) = Taken By, (c) = Cosigned By      Initials Name Provider Type    Landon Daniel PTA Physical Therapist Assistant                                   Time Calculation:   Start Time: 1300  Stop Time: 1310  Time Calculation (min): 10 min  Total Timed Code Minutes- PT: 10 minute(s)                   Landon Alaniz PTA  7/20/2023       "

## 2023-08-04 ENCOUNTER — OFFICE VISIT (OUTPATIENT)
Dept: FAMILY MEDICINE CLINIC | Facility: CLINIC | Age: 63
End: 2023-08-04
Payer: COMMERCIAL

## 2023-08-04 VITALS
HEIGHT: 74 IN | DIASTOLIC BLOOD PRESSURE: 88 MMHG | HEART RATE: 112 BPM | SYSTOLIC BLOOD PRESSURE: 140 MMHG | WEIGHT: 273 LBS | BODY MASS INDEX: 35.04 KG/M2 | OXYGEN SATURATION: 92 %

## 2023-08-04 DIAGNOSIS — M25.551 BILATERAL HIP PAIN: Primary | ICD-10-CM

## 2023-08-04 DIAGNOSIS — E78.5 DYSLIPIDEMIA: ICD-10-CM

## 2023-08-04 DIAGNOSIS — M25.552 BILATERAL HIP PAIN: Primary | ICD-10-CM

## 2023-08-04 DIAGNOSIS — M87.052 AVASCULAR NECROSIS OF BONE OF LEFT HIP: ICD-10-CM

## 2023-08-04 RX ORDER — KETOROLAC TROMETHAMINE 30 MG/ML
30 INJECTION, SOLUTION INTRAMUSCULAR; INTRAVENOUS EVERY 6 HOURS PRN
Status: SHIPPED | OUTPATIENT
Start: 2023-08-04 | End: 2023-08-09

## 2023-08-04 RX ADMIN — KETOROLAC TROMETHAMINE 30 MG: 30 INJECTION, SOLUTION INTRAMUSCULAR; INTRAVENOUS at 10:58

## 2023-08-09 NOTE — PROGRESS NOTES
"  Family Medicine Residency  Kaycee Sheikh MD    Subjective:     Martin Bender is a 62 y.o. male who presents for 3 month follow up for chronic hip pain. Patient has been seen by orthopedics for avascular necrosis of bilateral femoral heads and was referred to physical therapy. Patient states that physical therapy has not improved his pain but has worsened it. Today he endorses he is in pain in both of his hips and has taken his meloxicam with minimal relief. He was unsure if he needed to be seen by orthopedics again or his PCP. He was scheduled to have a follow up with orthopedics to repeat an MRI of the hip and receive a pain management referral, however patient states he was not aware of this follow up. He has no further complaints at today's visit.     The following portions of the patient's history were reviewed and updated as appropriate: allergies, current medications, past family history, past medical history, past social history, past surgical history, and problem list.    Past Medical Hx:  Past Medical History:   Diagnosis Date    Age related cataract     right    Artificial lens present     in position    Dyslipidemia     Encounter for general adult medical examination without abnormal findings     Gastroesophageal reflux disease     Hashimoto's thyroiditis     Headache     Kidney stone     Primary hyperparathyroidism     possible primary normocalcemic but vit D has to be corrected first    Secondary hyperparathyroidism     Seizure disorder        Past Surgical Hx:  Past Surgical History:   Procedure Laterality Date    COLONOSCOPY  2015    CYSTOSCOPY  01/11/2016    Left ureteroscopy with laser lithotripsy.Left retrograde pyelography.Left double J stent insertion, double J stent with no tether. [facility]. Per patient had stone removal \"25 times\"       Current Meds:    Current Outpatient Medications:     aspirin 81 MG chewable tablet, Chew 1 tablet Daily., Disp: 30 tablet, " Rfl: 11    atorvastatin (LIPITOR) 80 MG tablet, Take 1 tablet by mouth Daily., Disp: 90 tablet, Rfl: 2    levETIRAcetam (KEPPRA) 1000 MG tablet, Take 1 tablet by mouth 2 (Two) Times a Day., Disp: 60 tablet, Rfl: 6    levothyroxine (SYNTHROID, LEVOTHROID) 25 MCG tablet, TAKE 1 TABLET BY MOUTH DAILY., Disp: 90 tablet, Rfl: 1    meloxicam (MOBIC) 15 MG tablet, Take 1 tablet by mouth Daily., Disp: 30 tablet, Rfl: 2    Current Facility-Administered Medications:     ketorolac (TORADOL) injection 30 mg, 30 mg, Intramuscular, Q6H PRN, Kaycee Sheikh MD, 30 mg at 08/04/23 1058    Allergies:  Allergies   Allergen Reactions    Penicillins Rash       Family Hx:  Family History   Problem Relation Age of Onset    Cancer Other     Diabetes Other     Heart disease Other     Hypertension Other         Social History:  Social History     Socioeconomic History    Marital status: Single   Tobacco Use    Smoking status: Every Day     Packs/day: 0.50     Years: 30.00     Pack years: 15.00     Types: Cigarettes     Start date: 4/15/1993    Smokeless tobacco: Never   Substance and Sexual Activity    Alcohol use: No    Drug use: No       Review of Systems  Review of Systems   Constitutional:  Negative for activity change, appetite change, chills, fatigue and fever.   HENT:  Negative for congestion, rhinorrhea and sore throat.    Eyes:  Negative for visual disturbance.   Respiratory:  Negative for cough, chest tightness, shortness of breath and wheezing.    Cardiovascular:  Negative for chest pain, palpitations and leg swelling.   Gastrointestinal:  Negative for abdominal pain, constipation, diarrhea, nausea and vomiting.   Genitourinary:  Negative for difficulty urinating, dysuria, flank pain, hematuria and urgency.   Musculoskeletal:  Positive for arthralgias. Negative for back pain, joint swelling and myalgias.   Skin:  Negative for rash and wound.   Neurological:  Negative for dizziness, weakness, light-headedness and  "headaches.   Psychiatric/Behavioral:  Negative for dysphoric mood and sleep disturbance. The patient is not nervous/anxious.      Objective:     /88   Pulse 112   Ht 188 cm (74\")   Wt 124 kg (273 lb)   SpO2 92%   BMI 35.05 kg/mý   Physical Exam  Constitutional:       Appearance: Normal appearance.   HENT:      Head: Normocephalic.   Eyes:      Extraocular Movements: Extraocular movements intact.      Conjunctiva/sclera: Conjunctivae normal.      Pupils: Pupils are equal, round, and reactive to light.   Cardiovascular:      Rate and Rhythm: Normal rate and regular rhythm.      Pulses: Normal pulses.      Heart sounds: Normal heart sounds.   Pulmonary:      Effort: Pulmonary effort is normal.      Breath sounds: Normal breath sounds. No wheezing or rhonchi.   Abdominal:      General: Abdomen is flat. Bowel sounds are normal.      Palpations: Abdomen is soft.      Tenderness: There is no abdominal tenderness. There is no guarding.   Musculoskeletal:         General: Normal range of motion.      Cervical back: Normal range of motion and neck supple.      Right lower leg: No edema.      Left lower leg: No edema.   Skin:     General: Skin is warm and dry.      Capillary Refill: Capillary refill takes less than 2 seconds.   Neurological:      General: No focal deficit present.      Mental Status: He is alert and oriented to person, place, and time.      Sensory: No sensory deficit.      Motor: No weakness.      Gait: Gait normal.   Psychiatric:         Mood and Affect: Mood normal.         Behavior: Behavior normal.         Thought Content: Thought content normal.         Judgment: Judgment normal.        Assessment/Plan:     Diagnoses and all orders for this visit:    1. Bilateral hip pain (Primary)   -     ketorolac (TORADOL) injection 30 mg    2. Dyslipidemia: Needs to obtain lab work    3. Avascular necrosis of bone of bilateral hips : stable, follow up with orthopedics      Plan:   - Will give in office " toradol injection today for pain relief.   - Instructed patient to follow up with orthopedics, outlined in their note they wished to see him 4-6 weeks after physical therapy to repeat an MRI and send to pain management. AVN of his right hip is stable per orthopedics.   - Patient did not obtain lab work that was ordered at his last appointment for his annual physical, instructed patient to go to the lab after his appointment today to obtain this lab work.   - Follow up in 3 months to assess pain level and assure patient has received pain management referral.     Rx changes: See A/P  Patient Education: See A/P  Compliance at present is estimated to be satisfactory.   Efforts to improve compliance (if necessary) will be directed at increased exercise.    Depression screening: Patient screened positive for depression based on a PHQ-9 score of 0 on 6/13/2023.      Follow-up:     Return in about 3 months (around 11/4/2023).    Preventative:  Health Maintenance   Topic Date Due    Pneumococcal Vaccine 0-64 (2 - PCV) 01/28/2017    COVID-19 Vaccine (3 - Pfizer series) 06/21/2021    ZOSTER VACCINE (2 of 2) 08/16/2023    INFLUENZA VACCINE  10/01/2023    ANNUAL PHYSICAL  06/13/2024    COLORECTAL CANCER SCREENING  01/01/2025    TDAP/TD VACCINES (2 - Td or Tdap) 01/28/2026    HEPATITIS C SCREENING  Completed         Alcohol use:  reports no history of alcohol use.  Nicotine status  reports that he has been smoking cigarettes. He started smoking about 30 years ago. He has a 15.00 pack-year smoking history. He has never used smokeless tobacco.     Goals        Less pain      Barriers:  none              RISK SCORE: 3        This document has been electronically signed by Kaycee Sheikh MD on August 9, 2023 10:18 CDT

## 2023-08-10 NOTE — PROGRESS NOTES
I have seen the patient.  I have reviewed the notes, assessments, and/or procedures performed by Dr. Sheikh, I concur with her/his documentation and assessment and plan for Martin Bender.       This document has been electronically signed by Mychal Olmstead MD on August 10, 2023 13:32 CDT